# Patient Record
Sex: MALE | Race: WHITE | Employment: OTHER
[De-identification: names, ages, dates, MRNs, and addresses within clinical notes are randomized per-mention and may not be internally consistent; named-entity substitution may affect disease eponyms.]

---

## 2017-03-08 ENCOUNTER — OFFICE VISIT (OUTPATIENT)
Dept: NEUROLOGY | Facility: CLINIC | Age: 62
End: 2017-03-08

## 2017-03-08 VITALS
HEART RATE: 104 BPM | BODY MASS INDEX: 29.2 KG/M2 | WEIGHT: 204 LBS | SYSTOLIC BLOOD PRESSURE: 150 MMHG | DIASTOLIC BLOOD PRESSURE: 84 MMHG | HEIGHT: 70 IN

## 2017-03-08 DIAGNOSIS — G20 PARKINSON'S DISEASE (HCC): Primary | ICD-10-CM

## 2017-03-08 PROCEDURE — 99214 OFFICE O/P EST MOD 30 MIN: CPT | Performed by: PSYCHIATRY & NEUROLOGY

## 2017-03-08 RX ORDER — TESTOSTERONE CYPIONATE 200 MG/ML
200 INJECTION, SOLUTION INTRAMUSCULAR
COMMUNITY
Start: 2017-02-01 | End: 2018-04-04 | Stop reason: ALTCHOICE

## 2017-03-08 RX ORDER — ROPINIROLE 1 MG/1
1 TABLET, FILM COATED ORAL 3 TIMES DAILY
Qty: 90 TABLET | Refills: 5 | Status: SHIPPED | OUTPATIENT
Start: 2017-03-08 | End: 2017-05-30 | Stop reason: ALTCHOICE

## 2017-03-08 ASSESSMENT — ENCOUNTER SYMPTOMS
GASTROINTESTINAL NEGATIVE: 1
RESPIRATORY NEGATIVE: 1
EYES NEGATIVE: 1

## 2017-05-30 ENCOUNTER — OFFICE VISIT (OUTPATIENT)
Dept: NEUROLOGY | Age: 62
End: 2017-05-30
Payer: COMMERCIAL

## 2017-05-30 VITALS
HEART RATE: 92 BPM | HEIGHT: 70 IN | WEIGHT: 207.6 LBS | SYSTOLIC BLOOD PRESSURE: 133 MMHG | BODY MASS INDEX: 29.72 KG/M2 | DIASTOLIC BLOOD PRESSURE: 88 MMHG

## 2017-05-30 DIAGNOSIS — G20 PARKINSONISM, UNSPECIFIED PARKINSONISM TYPE (HCC): Primary | ICD-10-CM

## 2017-05-30 PROCEDURE — 99214 OFFICE O/P EST MOD 30 MIN: CPT | Performed by: PSYCHIATRY & NEUROLOGY

## 2017-05-30 RX ORDER — SILDENAFIL CITRATE 100 MG
TABLET ORAL NIGHTLY PRN
COMMUNITY
Start: 2017-04-26

## 2017-05-30 RX ORDER — CARBIDOPA AND LEVODOPA 25; 100 MG/1; MG/1
1 TABLET, EXTENDED RELEASE ORAL 3 TIMES DAILY
COMMUNITY
End: 2017-08-18 | Stop reason: ALTCHOICE

## 2017-05-30 ASSESSMENT — ENCOUNTER SYMPTOMS
EYES NEGATIVE: 1
RESPIRATORY NEGATIVE: 1
GASTROINTESTINAL NEGATIVE: 1

## 2017-06-05 ENCOUNTER — TELEPHONE (OUTPATIENT)
Dept: NEUROLOGY | Age: 62
End: 2017-06-05

## 2017-06-27 RX ORDER — SELEGILINE HYDROCHLORIDE 5 MG/1
5 CAPSULE ORAL
Qty: 180 CAPSULE | Refills: 0 | Status: SHIPPED | OUTPATIENT
Start: 2017-06-27 | End: 2018-04-04 | Stop reason: ALTCHOICE

## 2017-07-13 ENCOUNTER — TELEPHONE (OUTPATIENT)
Dept: NEUROLOGY | Age: 62
End: 2017-07-13

## 2017-08-18 ENCOUNTER — OFFICE VISIT (OUTPATIENT)
Dept: NEUROLOGY | Age: 62
End: 2017-08-18
Payer: COMMERCIAL

## 2017-08-18 VITALS
WEIGHT: 206 LBS | BODY MASS INDEX: 29.49 KG/M2 | HEART RATE: 73 BPM | DIASTOLIC BLOOD PRESSURE: 90 MMHG | SYSTOLIC BLOOD PRESSURE: 140 MMHG | HEIGHT: 70 IN

## 2017-08-18 DIAGNOSIS — G23.1 PROGRESSIVE SUPRANUCLEAR PALSY (HCC): Primary | ICD-10-CM

## 2017-08-18 PROCEDURE — 99214 OFFICE O/P EST MOD 30 MIN: CPT | Performed by: PSYCHIATRY & NEUROLOGY

## 2017-08-18 RX ORDER — QUETIAPINE FUMARATE 25 MG/1
12.5 TABLET, FILM COATED ORAL NIGHTLY
Refills: 5 | COMMUNITY
Start: 2017-08-15 | End: 2019-08-08 | Stop reason: ALTCHOICE

## 2017-08-18 RX ORDER — SELEGILINE HYDROCHLORIDE 5 MG/1
5 CAPSULE ORAL
Qty: 180 CAPSULE | Refills: 3 | Status: SHIPPED | OUTPATIENT
Start: 2017-08-18 | End: 2017-11-22 | Stop reason: SDUPTHER

## 2017-08-18 ASSESSMENT — ENCOUNTER SYMPTOMS
RESPIRATORY NEGATIVE: 1
GASTROINTESTINAL NEGATIVE: 1
EYES NEGATIVE: 1

## 2017-11-06 ENCOUNTER — TELEPHONE (OUTPATIENT)
Dept: NEUROLOGY | Age: 62
End: 2017-11-06

## 2017-11-22 ENCOUNTER — OFFICE VISIT (OUTPATIENT)
Dept: NEUROLOGY | Age: 62
End: 2017-11-22
Payer: COMMERCIAL

## 2017-11-22 VITALS
SYSTOLIC BLOOD PRESSURE: 141 MMHG | BODY MASS INDEX: 29.86 KG/M2 | DIASTOLIC BLOOD PRESSURE: 78 MMHG | HEIGHT: 70 IN | HEART RATE: 82 BPM | WEIGHT: 208.6 LBS

## 2017-11-22 DIAGNOSIS — G23.1 PROGRESSIVE SUPRANUCLEAR PALSY (HCC): Primary | ICD-10-CM

## 2017-11-22 PROCEDURE — 99214 OFFICE O/P EST MOD 30 MIN: CPT | Performed by: PSYCHIATRY & NEUROLOGY

## 2017-11-22 RX ORDER — DIVALPROEX SODIUM 250 MG/1
500 TABLET, DELAYED RELEASE ORAL 2 TIMES DAILY
Refills: 2 | COMMUNITY
Start: 2017-10-31 | End: 2020-08-25 | Stop reason: CLARIF

## 2017-11-22 RX ORDER — SELEGILINE HYDROCHLORIDE 5 MG/1
5 CAPSULE ORAL
Qty: 180 CAPSULE | Refills: 3 | Status: SHIPPED | OUTPATIENT
Start: 2017-11-22 | End: 2018-08-07 | Stop reason: ALTCHOICE

## 2017-11-22 ASSESSMENT — ENCOUNTER SYMPTOMS
GASTROINTESTINAL NEGATIVE: 1
ALLERGIC/IMMUNOLOGIC NEGATIVE: 1
EYES NEGATIVE: 1
RESPIRATORY NEGATIVE: 1

## 2017-11-22 NOTE — PROGRESS NOTES
Subjective:      Patient ID: Kylah Newman is a 64 y.o. male. HPI    Active problem progressive supranuclear palsy with rigidity, bradykinesia along with postural instability and increased muscle tone followed by [facility] . The condition is he has become more unsteady with more instability with tendency to fall backwards falling about three times per week with decreased arm swing reporting to have slower pace with feet not moving as fast . He has been placed on depakote through [facility] for he was felt to be aggressive short fuse . He was placed on seroquel at bedtime . He remains on provigil for excessive daytime sleepiness . He reports to be freezing in place  . He remains on eldepryl 5 mg po bid with him finding that he had some benefit initially . There are mild memory complaints retiring having retired from work  . Significant medication eldepryl 5 mg po bid, provigil 200 mg po qd , depakote 250 mg po bid , seroquel 25 mg po qhs  . Previously on azilect , sinemet and requip. Testing MRI of Head with nonspecific white matter signal changes in frontal lobe with mild ventricular enlargement with global volume loss and atrophy , June 2015 .  MRI of cervical spine with moderate canal stenosis C3-4 through C5-7      Past Medical History:   Diagnosis Date    Cancer ([address]     skin & prostate    Hyperlipidemia     Hypertension     Narcolepsy     Osteoarthritis     Parkinson disease ([address]     PSP (progressive supranuclear palsy) ([facility] [address] 05/05/2017       Past Surgical History:   Procedure Laterality Date    NOSE SURGERY  1973    PROSTATECTOMY  2012    radial    SHOULDER SURGERY Right 1998    TONSILLECTOMY AND ADENOIDECTOMY  1965       Family History   Problem Relation Age of Onset    High Blood Pressure Mother     Cancer Mother     High Blood Pressure Father     Diabetes Father     Heart Disease Father     Cancer Father      colon & liver       Social History     Social History    Marital status:      Spouse name: N/A    Number of children: N/A    Years of education: N/A     Social History Main Topics    Smoking status: Never Smoker    Smokeless tobacco: Never Used    Alcohol use 0.0 oz/week      Comment: occas    Drug use: No    Sexual activity: Not Asked     Other Topics Concern    None     Social History Narrative    None       Current Outpatient Prescriptions   Medication Sig Dispense Refill    divalproex (DEPAKOTE) 250 MG DR tablet Take 1 tablet by mouth 2 times daily  2    selegiline (ELDEPRYL) 5 MG capsule Take 1 capsule by mouth 2 times daily (before meals) 180 capsule 3    QUEtiapine (SEROQUEL) 25 MG tablet Take 12.5 mg by mouth nightly   5    VIAGRA 100 MG tablet       DEPO-TESTOSTERONE 200 MG/ML injection 200 mg  every two weeks IM.  chlorthalidone (HYGROTON) 25 MG tablet Take 1 tablet by mouth daily      modafinil (PROVIGIL) 200 MG tablet Take 200 mg by mouth 2 times daily      losartan (COZAAR) 100 MG tablet Take 100 mg by mouth daily.  pantoprazole (PROTONIX) 40 MG tablet   Take 40 mg by mouth daily as needed       simvastatin (ZOCOR) 40 MG tablet Take 40 mg by mouth nightly.  traMADol (ULTRAM) 50 MG tablet Take 50 mg by mouth every 12 hours as needed for Pain  .  selegiline (ELDEPRYL) 5 MG capsule Take 1 capsule by mouth 2 times daily (before meals) 180 capsule 0     No current facility-administered medications for this visit. No Known Allergies      Review of Systems   Constitutional: Positive for fatigue. HENT: Negative. Eyes: Negative. Respiratory: Negative. Cardiovascular: Negative. Gastrointestinal: Negative. Endocrine: Negative. Genitourinary: Negative. Musculoskeletal: Positive for gait problem and neck pain. Skin: Negative. Allergic/Immunologic: Negative. Neurological: Positive for speech difficulty. Hematological: Negative. All other systems reviewed and are negative.       Objective: Physical Exam    Vitals:    11/22/17 1504   BP: (!) 141/78   Pulse: 82     weight: 208 lb 9.6 oz (94.6 kg)    Neurological Examination  Constitutional .General exam well groomed   Ears /Nose/Throat: external ear . Normal exam  Neck and thyroid . Normal size  Respiratory . Breathsounds clear bilaterally  Cardiovascular: Auscultation of heart with regular rate and rhythm   Musculoskeletal. Muscle tone with minor rigidity                                                            Muscle strength 5/5 strength throughout                                                                                 No dysmetria or dysdiadokinesis  No tremor   Normal fine motor  Gait with reduced arm swing bilaterally with decrease en block turn with apraxia    Orientation Alert and oriented x 3   Short term memory normal  Attention and concentration normal   Language process and speech normal . No aphasia   Cranial nerve 2 normal acuety and visual fields  Cranial nerve 3, 4 and 6 . Saccadic pursuit. Decreased upward gaze  . Pupils are equal and reactive   Cranial nerve 5 . Normal strength of masseter and temporalis . Intact corneal reflex. Normal facial sensation  Cranial nerve 7 masked facies  Cranial nerve 8. Grossly intact hearing   Cranial nerve 9 and 10. Symmetric palate elevation   Cranial nerve 11 , 5 out of 5 strength   Cranial Nerve 12 midline tongue . No atrophy  Sensation . Normal proprioception . Intact Vibration . Normal pinprick and light touch   Deep Tendon Reflexes normal  Plantar response flexor bilaterally    Assessment:      1.  Progressive supranuclear palsy ([address]    Patient is to continue use cane for support to undergo gait retraining to continue on current medication regimen        Plan:      Orders Placed This Encounter   Procedures    Cane adjustable narrow base quad

## 2017-11-22 NOTE — LETTER
 PSP (progressive supranuclear palsy) ([address] 05/05/2017       Past Surgical History:   Procedure Laterality Date    NOSE SURGERY  1973    PROSTATECTOMY  2012    radial    SHOULDER SURGERY Right 1998    TONSILLECTOMY AND ADENOIDECTOMY  1965       Family History   Problem Relation Age of Onset    High Blood Pressure Mother     Cancer Mother     High Blood Pressure Father     Diabetes Father     Heart Disease Father     Cancer Father      colon & liver       Social History     Social History    Marital status:      Spouse name: N/A    Number of children: N/A    Years of education: N/A     Social History Main Topics    Smoking status: Never Smoker    Smokeless tobacco: Never Used    Alcohol use 0.0 oz/week      Comment: occas    Drug use: No    Sexual activity: Not Asked     Other Topics Concern    None     Social History Narrative    None       Current Outpatient Prescriptions   Medication Sig Dispense Refill    divalproex (DEPAKOTE) 250 MG DR tablet Take 1 tablet by mouth 2 times daily  2    selegiline (ELDEPRYL) 5 MG capsule Take 1 capsule by mouth 2 times daily (before meals) 180 capsule 3    QUEtiapine (SEROQUEL) 25 MG tablet Take 12.5 mg by mouth nightly   5    VIAGRA 100 MG tablet       DEPO-TESTOSTERONE 200 MG/ML injection 200 mg  every two weeks IM.  chlorthalidone (HYGROTON) 25 MG tablet Take 1 tablet by mouth daily      modafinil (PROVIGIL) 200 MG tablet Take 200 mg by mouth 2 times daily      losartan (COZAAR) 100 MG tablet Take 100 mg by mouth daily.  pantoprazole (PROTONIX) 40 MG tablet   Take 40 mg by mouth daily as needed       simvastatin (ZOCOR) 40 MG tablet Take 40 mg by mouth nightly.  traMADol (ULTRAM) 50 MG tablet Take 50 mg by mouth every 12 hours as needed for Pain  .  selegiline (ELDEPRYL) 5 MG capsule Take 1 capsule by mouth 2 times daily (before meals) 180 capsule 0     No current facility-administered medications for this visit. Sensation . Normal proprioception . Intact Vibration . Normal pinprick and light touch   Deep Tendon Reflexes normal  Plantar response flexor bilaterally    Assessment:      1. Progressive supranuclear palsy ([address]    Patient is to continue use cane for support to undergo gait retraining to continue on current medication regimen        Plan:      Orders Placed This Encounter   Procedures    Cane adjustable narrow base quad               If you have questions, please do not hesitate to call me. I look forward to following Nba Conde along with you.     Sincerely,        Trisha Avila MD    [facility] providers:  Nayana Lebron, [id number] [address]: [phone]

## 2017-11-27 NOTE — COMMUNICATION BODY
Subjective:      Patient ID: Yuriy Muller is a 64 y.o. male. HPI    Active problem progressive supranuclear palsy with rigidity, bradykinesia along with postural instability and increased muscle tone followed by [facility] . The condition is he has become more unsteady with more instability with tendency to fall backwards falling about three times per week with decreased arm swing reporting to have slower pace with feet not moving as fast . He has been placed on depakote through [facility] for he was felt to be aggressive short fuse . He was placed on seroquel at bedtime . He remains on provigil for excessive daytime sleepiness . He reports to be freezing in place  . He remains on eldepryl 5 mg po bid with him finding that he had some benefit initially . There are mild memory complaints retiring having retired from work  . Significant medication eldepryl 5 mg po bid, provigil 200 mg po qd , depakote 250 mg po bid , seroquel 25 mg po qhs  . Previously on azilect , sinemet and requip. Testing MRI of Head with nonspecific white matter signal changes in frontal lobe with mild ventricular enlargement with global volume loss and atrophy , June 2015 .  MRI of cervical spine with moderate canal stenosis C3-4 through C5-7      Past Medical History:   Diagnosis Date    Cancer ([address]     skin & prostate    Hyperlipidemia     Hypertension     Narcolepsy     Osteoarthritis     Parkinson disease ([address]     PSP (progressive supranuclear palsy) ([facility] [address] 05/05/2017       Past Surgical History:   Procedure Laterality Date    NOSE SURGERY  1973    PROSTATECTOMY  2012    radial    SHOULDER SURGERY Right 1998    TONSILLECTOMY AND ADENOIDECTOMY  1965       Family History   Problem Relation Age of Onset    High Blood Pressure Mother     Cancer Mother     High Blood Pressure Father     Diabetes Father     Heart Disease Father     Cancer Father      colon & liver       Social History     Social History   

## 2018-03-27 ENCOUNTER — TELEPHONE (OUTPATIENT)
Dept: NEUROLOGY | Age: 63
End: 2018-03-27

## 2018-04-04 ENCOUNTER — OFFICE VISIT (OUTPATIENT)
Dept: NEUROLOGY | Age: 63
End: 2018-04-04
Payer: COMMERCIAL

## 2018-04-04 VITALS
HEIGHT: 70 IN | WEIGHT: 216.8 LBS | DIASTOLIC BLOOD PRESSURE: 83 MMHG | HEART RATE: 69 BPM | BODY MASS INDEX: 31.04 KG/M2 | SYSTOLIC BLOOD PRESSURE: 137 MMHG

## 2018-04-04 DIAGNOSIS — G23.1 PROGRESSIVE SUPRANUCLEAR PALSY (HCC): Primary | ICD-10-CM

## 2018-04-04 PROCEDURE — 99214 OFFICE O/P EST MOD 30 MIN: CPT | Performed by: PSYCHIATRY & NEUROLOGY

## 2018-04-04 ASSESSMENT — ENCOUNTER SYMPTOMS
GASTROINTESTINAL NEGATIVE: 1
SHORTNESS OF BREATH: 1
EYES NEGATIVE: 1
BACK PAIN: 1
ALLERGIC/IMMUNOLOGIC NEGATIVE: 1

## 2018-05-22 ENCOUNTER — TELEPHONE (OUTPATIENT)
Dept: NEUROLOGY | Age: 63
End: 2018-05-22

## 2018-08-07 ENCOUNTER — OFFICE VISIT (OUTPATIENT)
Dept: NEUROLOGY | Age: 63
End: 2018-08-07
Payer: COMMERCIAL

## 2018-08-07 VITALS
SYSTOLIC BLOOD PRESSURE: 138 MMHG | HEART RATE: 84 BPM | BODY MASS INDEX: 32.07 KG/M2 | WEIGHT: 224 LBS | HEIGHT: 70 IN | DIASTOLIC BLOOD PRESSURE: 80 MMHG

## 2018-08-07 DIAGNOSIS — G23.1 PROGRESSIVE SUPRANUCLEAR PALSY (HCC): Primary | ICD-10-CM

## 2018-08-07 PROCEDURE — 99214 OFFICE O/P EST MOD 30 MIN: CPT | Performed by: PSYCHIATRY & NEUROLOGY

## 2018-08-07 ASSESSMENT — ENCOUNTER SYMPTOMS
COUGH: 1
GASTROINTESTINAL NEGATIVE: 1
SHORTNESS OF BREATH: 1
BACK PAIN: 1
ALLERGIC/IMMUNOLOGIC NEGATIVE: 1

## 2018-08-07 NOTE — PROGRESS NOTES
Subjective:      Patient ID: Jose Langston is a 58 y.o. male. HPI    Active problem progressive supranuclear palsy with rigidity, bradykinesia along with postural instability followed by [facility] using cane for support to undergo gait retraining  . 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 He was given prescription for rolling walker with seat along with PT not always using this . He is falling twice per week  . He is being prepped for drug study at [facility] with anti tau immunoglobulin infusion. He has undergone PT which is helping him unfreeze uing laser light trying to walk over the light  . He retains unsteadiness with shuffling with short gait with instability with tendency to fall backwards falling about twice per week . There is some postural tremor of right hand . He is on depakote through [facility] for he was felt to be aggressive with short fuse taking seroquel also at bedtime . He has sleep apnea being on nasal CPAP remaining on provigil. There are mild memory complaints . Significant medication provigil 200 mg po qd , depakote 250 mg po bid , seroquel 25 mg po qhs  . Previously on azilect , sinemet. , eldepryl , amantadine and requip. Testing MRI of Head with nonspecific white matter signal changes in frontal lobe with mild ventricular enlargement with global volume loss and atrophy , June 2015 .  MRI of cervical spine with moderate canal stenosis C3-4 through C5-7      Past Medical History:   Diagnosis Date    Cancer ([address]     skin & prostate    Hyperlipidemia     Hypertension     Narcolepsy     Osteoarthritis     Parkinson disease ([facility]     PSP (progressive supranuclear palsy) ([facility] [address] 05/05/2017    Sleep apnea        Past Surgical History:   Procedure Laterality Date    NOSE SURGERY  1973    PROSTATECTOMY  2012    radial    SHOULDER SURGERY Right 1998    TONSILLECTOMY AND ADENOIDECTOMY  1965       Family History   Problem Relation Age of Onset    High Blood Pressure Mother     Cancer Mother     High Blood Pressure Father     Diabetes Father     Heart Disease Father     Cancer Father         colon & liver       Social History     Social History    Marital status:      Spouse name: N/A    Number of children: N/A    Years of education: N/A     Social History Main Topics    Smoking status: Never Smoker    Smokeless tobacco: Never Used    Alcohol use 0.0 oz/week      Comment: occas    Drug use: No    Sexual activity: Not Asked     Other Topics Concern    None     Social History Narrative    None       Current Outpatient Prescriptions   Medication Sig Dispense Refill    divalproex (DEPAKOTE) 250 MG DR tablet Take 1 tablet by mouth 2 times daily  2    QUEtiapine (SEROQUEL) 25 MG tablet Take 12.5 mg by mouth nightly   5    VIAGRA 100 MG tablet       chlorthalidone (HYGROTON) 25 MG tablet Take 1 tablet by mouth daily      modafinil (PROVIGIL) 200 MG tablet Take 200 mg by mouth 2 times daily      losartan (COZAAR) 100 MG tablet Take 100 mg by mouth daily.  pantoprazole (PROTONIX) 40 MG tablet   Take 40 mg by mouth daily as needed       simvastatin (ZOCOR) 40 MG tablet Take 40 mg by mouth nightly. No current facility-administered medications for this visit. No Known Allergies      Review of Systems   Constitutional: Positive for fatigue and unexpected weight change. HENT: Negative. Eyes: Positive for visual disturbance. Respiratory: Positive for cough and shortness of breath. Cardiovascular: Positive for leg swelling. Gastrointestinal: Negative. Endocrine: Negative. Genitourinary: Positive for frequency and urgency.    Musculoskeletal: Positive for back pain, gait problem and neck pain. Skin: Negative. Allergic/Immunologic: Negative. Neurological: Positive for tremors and speech difficulty. Hematological: Negative. Objective:   Physical Exam    Vitals:    08/07/18 0951   BP: 138/80   Pulse: 84     weight: 224 lb (101.6 kg)    Neurological Examination  Constitutional .General exam well groomed   Ears /Nose/Throat: external ear . Normal exam  Neck and thyroid . Normal size  Respiratory . Breathsounds clear bilaterally  Cardiovascular: Auscultation of heart with regular rate and rhythm   Musculoskeletal. Muscle tone with minor rigidity                                                            Muscle strength 5/5 strength throughout                                                                                 No dysmetria or dysdiadokinesis  No tremor   Normal fine motor  Gait with reduced arm swing bilaterally with decrease en block turn with apraxia with truncal rigidity   Orientation Alert and oriented x 3   Short term memory normal  Attention and concentration normal   Language process and speech normal . No aphasia   Cranial nerve 2 normal acuety and visual fields  Cranial nerve 3, 4 and 6 . Saccadic pursuit. Decreased upward gaze  . Pupils are equal and reactive   Cranial nerve 5 . Normal strength of masseter and temporalis . Intact corneal reflex. Normal facial sensation  Cranial nerve 7 masked facies  Cranial nerve 8. Grossly intact hearing   Cranial nerve 9 and 10. Symmetric palate elevation   Cranial nerve 11 , 5 out of 5 strength   Cranial Nerve 12 midline tongue . No atrophy  Sensation . Normal proprioception . Intact Vibration . Normal pinprick and light touch   Deep Tendon Reflexes normal  Plantar response flexor bilaterally    Assessment:      1.  Progressive supranuclear palsy ([address]    He is to continue PT and current regimen with fu at [facility]:      As above

## 2018-08-08 NOTE — COMMUNICATION BODY
Subjective:      Patient ID: Luh Curran is a 58 y.o. male. HPI    Active problem progressive supranuclear palsy with rigidity, bradykinesia along with postural instability followed by [facility] using cane for support to undergo gait retraining  . 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 He was given prescription for rolling walker with seat along with PT not always using this . He is falling twice per week  . He is being prepped for drug study at [facility] with anti tau immunoglobulin infusion. He has undergone PT which is helping him unfreeze uing laser light trying to walk over the light  . He retains unsteadiness with shuffling with short gait with instability with tendency to fall backwards falling about twice per week . There is some postural tremor of right hand . He is on depakote through [facility] for he was felt to be aggressive with short fuse taking seroquel also at bedtime . He has sleep apnea being on nasal CPAP remaining on provigil. There are mild memory complaints . Significant medication provigil 200 mg po qd , depakote 250 mg po bid , seroquel 25 mg po qhs  . Previously on azilect , sinemet. , eldepryl , amantadine and requip. Testing MRI of Head with nonspecific white matter signal changes in frontal lobe with mild ventricular enlargement with global volume loss and atrophy , June 2015 .  MRI of cervical spine with moderate canal stenosis C3-4 through C5-7      Past Medical History:   Diagnosis Date    Cancer ([address]     skin & prostate    Hyperlipidemia     Hypertension     Narcolepsy     Osteoarthritis     Parkinson disease ([facility] [facility]     PSP (progressive supranuclear palsy) ([facility] [address] 05/05/2017    pain, gait problem and neck pain. Skin: Negative. Allergic/Immunologic: Negative. Neurological: Positive for tremors and speech difficulty. Hematological: Negative. Objective:   Physical Exam    Vitals:    08/07/18 0951   BP: 138/80   Pulse: 84     weight: 224 lb (101.6 kg)    Neurological Examination  Constitutional .General exam well groomed   Ears /Nose/Throat: external ear . Normal exam  Neck and thyroid . Normal size  Respiratory . Breathsounds clear bilaterally  Cardiovascular: Auscultation of heart with regular rate and rhythm   Musculoskeletal. Muscle tone with minor rigidity                                                            Muscle strength 5/5 strength throughout                                                                                 No dysmetria or dysdiadokinesis  No tremor   Normal fine motor  Gait with reduced arm swing bilaterally with decrease en block turn with apraxia with truncal rigidity   Orientation Alert and oriented x 3   Short term memory normal  Attention and concentration normal   Language process and speech normal . No aphasia   Cranial nerve 2 normal acuety and visual fields  Cranial nerve 3, 4 and 6 . Saccadic pursuit. Decreased upward gaze  . Pupils are equal and reactive   Cranial nerve 5 . Normal strength of masseter and temporalis . Intact corneal reflex. Normal facial sensation  Cranial nerve 7 masked facies  Cranial nerve 8. Grossly intact hearing   Cranial nerve 9 and 10. Symmetric palate elevation   Cranial nerve 11 , 5 out of 5 strength   Cranial Nerve 12 midline tongue . No atrophy  Sensation . Normal proprioception . Intact Vibration . Normal pinprick and light touch   Deep Tendon Reflexes normal  Plantar response flexor bilaterally    Assessment:      1.  Progressive supranuclear palsy ([address]    He is to continue PT and current regimen with FU at [facility]:      As above

## 2018-11-12 ENCOUNTER — OFFICE VISIT (OUTPATIENT)
Dept: NEUROLOGY | Age: 63
End: 2018-11-12
Payer: COMMERCIAL

## 2018-11-12 VITALS
DIASTOLIC BLOOD PRESSURE: 86 MMHG | HEART RATE: 95 BPM | SYSTOLIC BLOOD PRESSURE: 138 MMHG | HEIGHT: 70 IN | WEIGHT: 222.6 LBS | BODY MASS INDEX: 31.87 KG/M2

## 2018-11-12 DIAGNOSIS — G23.1 PROGRESSIVE SUPRANUCLEAR PALSY (HCC): Primary | ICD-10-CM

## 2018-11-12 DIAGNOSIS — M48.02 CERVICAL STENOSIS OF SPINE: ICD-10-CM

## 2018-11-12 DIAGNOSIS — R26.81 GAIT INSTABILITY: ICD-10-CM

## 2018-11-12 PROCEDURE — 99214 OFFICE O/P EST MOD 30 MIN: CPT | Performed by: PSYCHIATRY & NEUROLOGY

## 2018-11-12 RX ORDER — AMANTADINE HYDROCHLORIDE 100 MG/1
100 TABLET ORAL DAILY
COMMUNITY
End: 2019-02-20 | Stop reason: ALTCHOICE

## 2018-11-12 RX ORDER — WHEELCHAIR
EACH MISCELLANEOUS
Qty: 1 EACH | Refills: 0 | Status: SHIPPED | OUTPATIENT
Start: 2018-11-12

## 2018-11-12 RX ORDER — SELEGILINE HYDROCHLORIDE 5 MG/1
5 TABLET ORAL 2 TIMES DAILY
COMMUNITY
End: 2020-08-25 | Stop reason: ALTCHOICE

## 2018-11-12 ASSESSMENT — ENCOUNTER SYMPTOMS
SHORTNESS OF BREATH: 1
ALLERGIC/IMMUNOLOGIC NEGATIVE: 1
BACK PAIN: 1
GASTROINTESTINAL NEGATIVE: 1

## 2018-11-12 NOTE — PROGRESS NOTES
Subjective:      Patient ID: Ange Kingsley is a 58 y.o. male. HPI    Active problem progressive supranuclear palsy with rigidity, bradykinesia along with postural instability followed by [facility] . The condition is he reports he is using U step using this more often feeling more secure with this walking device  . There are falls every two days loosing balance some when not using U step freezing the majority falling backwards . If he lets go of the handle  U step will stop although he will usually still  handle and U step will go forwards with him being stuck in place unable to keep up . There is neck which is there all  the time at grade 4 over 10 using naprosyn. There is also low back stiffness . He is on drug study through [facility] being on tau binding medication. He has gone back on eldepryl 5 mg po bid 2 months ago along with being placed on amantadine to be increased currently on 100 mg po qd . He is on depakote through [facility] for he was felt to be aggressive with short fuse taking seroquel also at bedtime . He has sleep apnea being on nasal CPAP remaining on provigil. There are mild memory complaints . Significant medication provigil 200 mg po bid , depakote 250 mg po bid , seroquel 25 mg po qhs , eldepryl 5 mg po bid , amantadine 100 mg po qd  . Previously on azilect , sinemet and requip . Testing MRI of Head with nonspecific white matter signal changes in frontal lobe with mild ventricular enlargement with global volume loss and atrophy , June 2015 .  MRI of cervical spine with moderate canal stenosis C3-4 through C5-7      Past Medical History:   Diagnosis Date    Cancer ([address]     skin & prostate    Hyperlipidemia     Hypertension     Narcolepsy     Osteoarthritis     [facility]     PSP (progressive supranuclear palsy) ([facility] 05/05/2017    Sleep apnea        Past Surgical History:   Procedure Laterality Date    NOSE SURGERY  1973    PROSTATECTOMY  2012 Negative. Eyes: Positive for visual disturbance. Respiratory: Positive for shortness of breath. Cardiovascular: Positive for leg swelling. Gastrointestinal: Negative. Endocrine: Negative. Genitourinary: Positive for frequency and urgency. Musculoskeletal: Positive for arthralgias, back pain, gait problem, myalgias and neck pain. Skin: Negative. Allergic/Immunologic: Negative. Neurological: Positive for tremors and speech difficulty. Hematological: Negative. Psychiatric/Behavioral: Negative. Objective:   Physical Exam    Vitals:    11/12/18 1430   BP: 138/86   Pulse: 95     weight: 222 lb 9.6 oz (101 kg)    Neurological Examination  Constitutional .General exam well groomed   Ears /Nose/Throat: external ear . Normal exam  Neck and thyroid . Normal size  Respiratory . Breathsounds clear bilaterally  Cardiovascular: Auscultation of heart with regular rate and rhythm   Musculoskeletal. Muscle tone with minor rigidity                                                            Muscle strength 5/5 strength throughout                                                                                 No dysmetria or dysdiadokinesis  No tremor   Normal fine motor  Gait with reduced arm swing bilaterally with decrease en block turn with apraxia with truncal rigidity   Orientation Alert and oriented x 3   Short term memory normal  Attention and concentration normal   Language process and speech normal . No aphasia   Cranial nerve 2 normal acuety and visual fields  Cranial nerve 3, 4 and 6 . Saccadic pursuit. Decreased upward gaze  . Pupils are equal and reactive   Cranial nerve 5 . Normal strength of masseter and temporalis . Intact corneal reflex. Normal facial sensation  Cranial nerve 7 masked facies  Cranial nerve 8. Grossly intact hearing   Cranial nerve 9 and 10. Symmetric palate elevation   Cranial nerve 11 , 5 out of 5 strength   Cranial Nerve 12 midline tongue .  No atrophy  Sensation

## 2018-11-13 ENCOUNTER — TELEPHONE (OUTPATIENT)
Dept: NEUROLOGY | Age: 63
End: 2018-11-13

## 2018-11-13 NOTE — COMMUNICATION BODY
radial    SHOULDER SURGERY Right 1998    TONSILLECTOMY AND ADENOIDECTOMY  1965       Family History   Problem Relation Age of Onset    High Blood Pressure Mother     Cancer Mother     High Blood Pressure Father     Diabetes Father     Heart Disease Father     Cancer Father         colon & liver       Social History     Social History    Marital status:      Spouse name: N/A    Number of children: N/A    Years of education: N/A     Social History Main Topics    Smoking status: Never Smoker    Smokeless tobacco: Never Used    Alcohol use 0.0 oz/week      Comment: occas    Drug use: No    Sexual activity: Not Asked     Other Topics Concern    None     Social History Narrative    None       Current Outpatient Prescriptions   Medication Sig Dispense Refill    NAPROXEN PO Take 220 mg by mouth 2 times daily as needed      selegiline (ELDEPRYL) 5 MG tablet Take 5 mg by mouth 2 times daily      Amantadine (SYMMETREL) 100 MG TABS tablet Take 100 mg by mouth daily      MINOXIDIL, TOPICAL, 5 % SOLN Apply topically 2 times daily      Misc. Devices (ROLLER WALKER) MISC 1 each by Does not apply route daily With brakes and seat 1 each 0    Misc. Devices (WHEELCHAIR) MISC Light wheelchair 1 each 0    divalproex (DEPAKOTE) 250 MG DR tablet Take 1 tablet by mouth 2 times daily  2    QUEtiapine (SEROQUEL) 25 MG tablet Take 12.5 mg by mouth nightly   5    VIAGRA 100 MG tablet       chlorthalidone (HYGROTON) 25 MG tablet Take 1 tablet by mouth daily      modafinil (PROVIGIL) 200 MG tablet Take 200 mg by mouth 2 times daily      losartan (COZAAR) 100 MG tablet Take 100 mg by mouth daily.  pantoprazole (PROTONIX) 40 MG tablet   Take 40 mg by mouth daily as needed       simvastatin (ZOCOR) 40 MG tablet Take 40 mg by mouth nightly. No current facility-administered medications for this visit. No Known Allergies      Review of Systems   Constitutional: Positive for fatigue.    HENT:

## 2018-11-16 DIAGNOSIS — G23.1 PROGRESSIVE SUPRANUCLEAR OPHTHALMOPLEGIA (HCC): Primary | ICD-10-CM

## 2018-11-26 DIAGNOSIS — R26.81 GAIT INSTABILITY: ICD-10-CM

## 2019-02-20 ENCOUNTER — OFFICE VISIT (OUTPATIENT)
Dept: NEUROLOGY | Age: 64
End: 2019-02-20
Payer: COMMERCIAL

## 2019-02-20 VITALS
SYSTOLIC BLOOD PRESSURE: 139 MMHG | DIASTOLIC BLOOD PRESSURE: 84 MMHG | HEIGHT: 70 IN | WEIGHT: 218 LBS | HEART RATE: 74 BPM | BODY MASS INDEX: 31.21 KG/M2

## 2019-02-20 DIAGNOSIS — G23.1 PROGRESSIVE SUPRANUCLEAR OPHTHALMOPLEGIA (HCC): Primary | ICD-10-CM

## 2019-02-20 PROCEDURE — 99214 OFFICE O/P EST MOD 30 MIN: CPT | Performed by: PSYCHIATRY & NEUROLOGY

## 2019-02-20 RX ORDER — BRIMONIDINE TARTRATE 2 MG/ML
1 SOLUTION/ DROPS OPHTHALMIC 2 TIMES DAILY
COMMUNITY

## 2019-02-20 ASSESSMENT — ENCOUNTER SYMPTOMS
ALLERGIC/IMMUNOLOGIC NEGATIVE: 1
BACK PAIN: 1
RESPIRATORY NEGATIVE: 1
GASTROINTESTINAL NEGATIVE: 1

## 2019-08-08 ENCOUNTER — OFFICE VISIT (OUTPATIENT)
Dept: NEUROLOGY | Age: 64
End: 2019-08-08
Payer: COMMERCIAL

## 2019-08-08 VITALS
DIASTOLIC BLOOD PRESSURE: 75 MMHG | WEIGHT: 218.8 LBS | SYSTOLIC BLOOD PRESSURE: 112 MMHG | BODY MASS INDEX: 31.32 KG/M2 | HEART RATE: 76 BPM | HEIGHT: 70 IN

## 2019-08-08 DIAGNOSIS — G23.1 PROGRESSIVE SUPRANUCLEAR OPHTHALMOPLEGIA (HCC): Primary | ICD-10-CM

## 2019-08-08 PROCEDURE — 99214 OFFICE O/P EST MOD 30 MIN: CPT | Performed by: PSYCHIATRY & NEUROLOGY

## 2019-08-08 RX ORDER — SELEGILINE HYDROCHLORIDE 5 MG/1
5 CAPSULE ORAL
Qty: 180 CAPSULE | Refills: 3 | Status: SHIPPED
Start: 2019-08-08 | End: 2020-08-25 | Stop reason: ALTCHOICE

## 2019-08-08 ASSESSMENT — ENCOUNTER SYMPTOMS
GASTROINTESTINAL NEGATIVE: 1
COUGH: 1
BACK PAIN: 1
SHORTNESS OF BREATH: 1
ALLERGIC/IMMUNOLOGIC NEGATIVE: 1

## 2019-08-08 NOTE — LETTER
with global volume loss and atrophy , June 2015 . MRI of cevical sine mild mass effect on central cord at C4-5 and minor C5-6      Past Medical History:   Diagnosis Date    [facility] ([facility]     skin & prostate    Glaucoma     Hyperlipidemia     Hypertension     Narcolepsy     Osteoarthritis     Parkinson disease [facility]     PSP [facility]pro[facility] supranuclear palsy) [facility] 05/05/2017    Sleep apnea        Past Surgical History:   Procedure Laterality Date    NOSE SURGERY  1973    PROSTATECTOMY  2012    radial    SHOULDER SURGERY Right 1998    TONSILLECTOMY AND ADENOIDECTOMY  1965       Family History   Problem Relation Age of Onset    High Blood Pressure Mother     Cancer Mother     High Blood Pressure Father     Diabetes Father     Heart Disease Father     Cancer Father         colon & liver       Social History     Socioeconomic History    Marital status:      Spouse name: None    Number of children: None    Years of education: None    Highest education level: None   Occupational History    None   Social Needs    Financial resource strain: None    Food insecurity:     Worry: None     Inability: None    Transportation needs:     Medical: None     Non-medical: None   Tobacco Use    Smoking status: Never Smoker    Smokeless tobacco: Never Used   Substance and Sexual Activity    Alcohol use:  Yes     Alcohol/week: 0.0 standard drinks     Comment: occas    Drug use: No    Sexual activity: None   Lifestyle    Physical activity:     Days per week: None     Minutes per session: None    Stress: None   Relationships    Social connections:     Talks on phone: None     Gets together: None     Attends Jew service: None     Active member of club or organization: None     Attends meetings of clubs or organizations: None     Relationship status: None    Intimate partner violence:     Fear of current or ex partner: None     Emotionally abused: None     Physically abused: None Forced sexual activity: None   Other Topics Concern    None   Social History Narrative    None       Current Outpatient Medications   Medication Sig Dispense Refill    selegiline (ELDEPRYL) 5 MG capsule Take 1 capsule by mouth 2 times daily (before meals) 180 capsule 3    timolol (BETIMOL) 0.5 % ophthalmic solution 1 drop 2 times daily      brimonidine (ALPHAGAN P) 0.15 % ophthalmic solution 1 drop 2 times daily      NAPROXEN PO Take 220 mg by mouth 2 times daily as needed      selegiline (ELDEPRYL) 5 MG tablet Take 5 mg by mouth 2 times daily      MINOXIDIL, TOPICAL, 5 % SOLN Apply topically 2 times daily      Misc. Devices (ROLLER WALKER) MISC 1 each by Does not apply route daily With brakes and seat 1 each 0    Misc. Devices (WHEELCHAIR) MISC Light wheelchair 1 each 0    divalproex (DEPAKOTE) 250 MG DR tablet Take 500 mg by mouth 2 times daily   2    VIAGRA 100 MG tablet       chlorthalidone (HYGROTON) 25 MG tablet Take 1 tablet by mouth daily      modafinil (PROVIGIL) 200 MG tablet Take 200 mg by mouth 2 times daily      losartan (COZAAR) 100 MG tablet Take 100 mg by mouth daily.  pantoprazole (PROTONIX) 40 MG tablet   Take 40 mg by mouth daily as needed       simvastatin (ZOCOR) 40 MG tablet Take 40 mg by mouth nightly. No current facility-administered medications for this visit. No Known Allergies      Review of Systems   Constitutional: Positive for fatigue. HENT: Negative. Respiratory: Positive for cough and shortness of breath. Cardiovascular: Negative. Gastrointestinal: Negative. Endocrine: Negative. Genitourinary: Positive for frequency and urgency. Musculoskeletal: Positive for arthralgias, back pain, gait problem, myalgias and neck pain. Skin: Negative. Allergic/Immunologic: Negative. Neurological: Positive for dizziness, tremors, speech difficulty, weakness and numbness. Hematological: Negative. Psychiatric/Behavioral: Negative.

## 2019-08-08 NOTE — PROGRESS NOTES
Subjective:      Patient ID: Ada Soler is a 61 y.o. male. HPI    Active problem progressive supranuclear palsy with rigidity, bradykinesia along with postural instability followed by [facility] . Cervical stenosis  . The condition is he reports that tau protein drug has been stopped being off medication since July having gotten IV infusion every month . His wife reports that he has been freezing more since he has been off the medication mainly on turning being stuck in place for few minutes always more in lateral movement  . He will fall daily loosing balance falling mainly falling backwards . He use walker for longer distances not using walker all the time al falls being without walker use . There is no trouble swallowing . His speech is getting softer . He is off seroquel making him sleepy. He is off amantadine having skin reaction of livido reticularis. There is intermittent neck pain attenuated with aleve . There is mild low back stiffness . He is on eldepryl 5 mg po bid  . He is on depakote through [facility] for he was felt to be aggressive . He has sleep apnea being on nasal CPAP remaining on provigil. There are mild memory complaints . Significant medication provigil 200 mg po bid , depakote 250 mg po bid , eldepryl 5 mg po bid  . Previously on azilect ,seroqul ,  sinemet and requip . Testing MRI of Head with nonspecific white matter signal changes in frontal lobe with mild ventricular enlargement with global volume loss and atrophy , June 2015 .   MRI of cevical sine mild mass effect on central cord at C4-5 and minor C5-6      Past Medical History:   Diagnosis Date    Cancer ([address]     skin & prostate    Glaucoma     Hyperlipidemia     Hypertension     Narcolepsy     Osteoarthritis     [facility]     PSP (progressive supranuclear palsy) ([facility] 05/05/2017    Sleep apnea        Past Surgical History:   Procedure Laterality Date    NOSE SURGERY  1973    PROSTATECTOMY

## 2019-08-08 NOTE — COMMUNICATION BODY
Subjective:      Patient ID: Tomy Tariq is a 61 y.o. male. HPI    Active problem progressive supranuclear palsy with rigidity, bradykinesia along with postural instability followed by [facility] . Cervical stenosis  . The condition is he reports that tau protein drug has been stopped being off medication since July having gotten IV infusion every month . His wife reports that he has been freezing more since he has been off the medication mainly on turning being stuck in place for few minutes always more in lateral movement  . He will fall daily loosing balance falling mainly falling backwards . He use walker for longer distances not using walker all the time al falls being without walker use . There is no trouble swallowing . His speech is getting softer . He is off seroquel making him sleepy. He is off amantadine having skin reaction of livido reticularis. There is intermittent neck pain attenuated with aleve . There is mild low back stiffness . He is on eldepryl 5 mg po bid  . He is on depakote through [facility] for he was felt to be aggressive . He has sleep apnea being on nasal CPAP remaining on provigil. There are mild memory complaints . Significant medication provigil 200 mg po bid , depakote 250 mg po bid , eldepryl 5 mg po bid  . Previously on azilect ,seroqul ,  sinemet and requip . Testing MRI of Head with nonspecific white matter signal changes in frontal lobe with mild ventricular enlargement with global volume loss and atrophy , June 2015 .   MRI of cevical sine mild mass effect on central cord at C4-5 and minor C5-6      Past Medical History:   Diagnosis Date    [facility] ([facility]     skin & prostate    Glaucoma     Hyperlipidemia     Hypertension     Narcolepsy     Osteoarthritis     Parkinson disease ([facility]     PSP (progressive supranuclear palsy) ([facility] 05/05/2017    Sleep apnea        Past Surgical History:   Procedure Laterality Date    NOSE SURGERY  1973    PROSTATECTOMY

## 2020-02-19 ENCOUNTER — OFFICE VISIT (OUTPATIENT)
Dept: NEUROLOGY | Age: 65
End: 2020-02-19
Payer: COMMERCIAL

## 2020-02-19 VITALS
SYSTOLIC BLOOD PRESSURE: 132 MMHG | DIASTOLIC BLOOD PRESSURE: 83 MMHG | WEIGHT: 218.6 LBS | BODY MASS INDEX: 31.3 KG/M2 | HEIGHT: 70 IN | HEART RATE: 74 BPM

## 2020-02-19 PROCEDURE — 99214 OFFICE O/P EST MOD 30 MIN: CPT | Performed by: PSYCHIATRY & NEUROLOGY

## 2020-02-19 RX ORDER — ARMODAFINIL 250 MG/1
250 TABLET ORAL DAILY
COMMUNITY

## 2020-02-19 RX ORDER — CHOLECALCIFEROL (VITAMIN D3) 125 MCG
5 CAPSULE ORAL NIGHTLY
COMMUNITY

## 2020-02-19 ASSESSMENT — ENCOUNTER SYMPTOMS
BACK PAIN: 1
GASTROINTESTINAL NEGATIVE: 1
COUGH: 1
SHORTNESS OF BREATH: 1
ALLERGIC/IMMUNOLOGIC NEGATIVE: 1

## 2020-02-19 NOTE — LETTER
[facility]  [phone] [facility] [address]  Phone: [phone]  Fax: [phone]    Matthew Sorto MD        February 20, 2020       Patient: Elise Ramirez   MR Number: [id number]   YOB: 1955   Date of Visit: 2/19/2020       Dear Dr. Wally Butler: Thank you for the request for consultation for [facility] . Below are the relevant portions of my assessment and plan of care. Subjective:      Patient ID: Elise Ramirez is a 59 y.o. male. HPI    Active problem progressive supranuclear palsy with rigidity, bradykinesia along with postural instability followed by [facility] . Cervical stenosis  . The condition is he reports is exercising lifting weights three times per week using also low lying bike during the summer  . He reports that he is having freezing getting stuck in place for about one minute mainly precicipitated on turning . He is not using walker intermittently having a falls mostly backwards . There maybe some choking having passed swallow study. He has been arranged to have another course of speech therapy through [facility] for dysarthria and swallowing issues . He is done with study drug . He has stopped eldepryl not having seen any difference on this medication . He is also off depakote with no behavioral issues at this time . There is intermittent neck pain attenuated with aleve . There is mild low back stiffness . He has sleep apnea being on nasal CPAP remaining on provigil. There are mild memory complaints . Significant medication provigil 200 mg po bid   . Previously on depakote , eldepryl azilect ,seroquel , sinemet and requip . Testing MRI of Head with nonspecific white matter signal changes in frontal lobe with mild ventricular enlargement with global volume loss and atrophy , June 2015 .   MRI of cevical sine mild mass effect on central cord at C4-5 and minor C5-6      Past Medical History:

## 2020-02-19 NOTE — PROGRESS NOTES
Subjective:      Patient ID: Sunita Quintana is a 59 y.o. male. HPI    Active problem progressive supranuclear palsy with rigidity, bradykinesia along with postural instability followed by [facility] . Cervical stenosis  . The condition is he reports is exercising lifting weights three times per week using also low lying bike during the summer  . He reports that he is having freezing getting stuck in place for about one minute mainly precicipitated on turning . He is not using walker intermittently having a falls mostly backwards . There maybe some choking having passed swallow study. He has been arranged to have another course of speech therapy through [facility] for dysarthria and swallowing issues . He is done with study drug . He has stopped eldepryl not having seen any difference on this medication . He is also off depakote with no behavioral issues at this time . There is intermittent neck pain attenuated with aleve . There is mild low back stiffness . He has sleep apnea being on nasal CPAP remaining on provigil. There are mild memory complaints . Significant medication provigil 200 mg po bid   . Previously on depakote , eldepryl azilect ,seroquel , sinemet and requip . Testing MRI of Head with nonspecific white matter signal changes in frontal lobe with mild ventricular enlargement with global volume loss and atrophy , June 2015 .   MRI of cevical sine mild mass effect on central cord at C4-5 and minor C5-6      Past Medical History:   Diagnosis Date    Cancer ([address]     skin & prostate    Glaucoma     Hyperlipidemia     Hypertension     Narcolepsy     Osteoarthritis     Parkinson disease ([address]     PSP (progressive supranuclear palsy) ([address] 05/05/2017    Sleep apnea        Past Surgical History:   Procedure Laterality Date    NOSE SURGERY  1973    PROSTATECTOMY  2012    radial    SHOULDER SURGERY Right 1998    TONSILLECTOMY AND ADENOIDECTOMY  1965       Family History   Problem Relation Age of Onset    High Blood Pressure Mother     Cancer Mother     High Blood Pressure Father     Diabetes Father     Heart Disease Father     Cancer Father         colon & liver       Social History     Socioeconomic History    Marital status:      Spouse name: None    Number of children: None    Years of education: None    Highest education level: None   Occupational History    None   Social Needs    Financial resource strain: None    Food insecurity:     Worry: None     Inability: None    Transportation needs:     Medical: None     Non-medical: None   Tobacco Use    Smoking status: Never Smoker    Smokeless tobacco: Never Used   Substance and Sexual Activity    Alcohol use: Yes     Alcohol/week: 0.0 standard drinks     Comment: occas    Drug use: No    Sexual activity: None   Lifestyle    Physical activity:     Days per week: None     Minutes per session: None    Stress: None   Relationships    Social connections:     Talks on phone: None     Gets together: None     Attends [employer] service: None     Active member of club or organization: None     Attends meetings of clubs or organizations: None     Relationship status: None    Intimate partner violence:     Fear of current or ex partner: None     Emotionally abused: None     Physically abused: None     Forced sexual activity: None   Other Topics Concern    None   Social History Narrative    None       Current Outpatient Medications   Medication Sig Dispense Refill    Armodafinil (NUVIGIL) 250 MG TABS Take 250 mg by mouth daily.       Oxymetazoline HCl (AFRIN NASAL SPRAY NA) by Nasal route      diphenhydrAMINE HCl (BENADRYL PO) Take 12.5 mg by mouth      melatonin 5 MG TABS tablet Take 5 mg by mouth daily      timolol (BETIMOL) 0.5 % ophthalmic solution 1 drop 2 times daily      brimonidine (ALPHAGAN) 0.2 % ophthalmic solution 1 drop 2 times daily       NAPROXEN PO Take 220 mg by mouth 2 times daily as needed      bilaterally  Cardiovascular: Auscultation of heart with regular rate and rhythm   Musculoskeletal. Muscle tone with minor rigidity                                                            Muscle strength 5/5 strength throughout                                                                                 No dysmetria or dysdiadokinesis  No tremor   Normal fine motor  Gait with reduced arm swing bilaterally with decrease en block turn with apraxia with truncal rigidity   Orientation Alert and oriented x 3   Short term memory normal  Attention and concentration normal   Language process and speech normal . No aphasia   Cranial nerve 2 normal acuety and visual fields  Cranial nerve 3, 4 and 6 . Saccadic pursuit. Decreased upward gaze  . Pupils are equal and reactive   Cranial nerve 5 . Normal strength of masseter and temporalis . Intact corneal reflex. Normal facial sensation  Cranial nerve 7 masked facies  Cranial nerve 8. Grossly intact hearing   Cranial nerve 9 and 10. Symmetric palate elevation   Cranial nerve 11 , 5 out of 5 strength   Cranial Nerve 12 midline tongue . No atrophy  Sensation . Normal proprioception . Intact Vibration . Normal pinprick and light touch   Deep Tendon Reflexes normal  Plantar response flexor bilaterally    Assessment:      1. Progressive supranuclear ophthalmoplegia (HCC)    2. Gait apraxia    3.  Dysphagia, unspecified type    He is to use walker at all times for support to proceed with speech therapy for dysphagia along with slurring      Plan:      As above

## 2020-02-20 NOTE — COMMUNICATION BODY
Relation Age of Onset    High Blood Pressure Mother     Cancer Mother     High Blood Pressure Father     Diabetes Father     Heart Disease Father     Cancer Father         colon & liver       Social History     Socioeconomic History    Marital status:      Spouse name: None    Number of children: None    Years of education: None    Highest education level: None   Occupational History    None   Social Needs    Financial resource strain: None    Food insecurity:     Worry: None     Inability: None    Transportation needs:     Medical: None     Non-medical: None   Tobacco Use    Smoking status: Never Smoker    Smokeless tobacco: Never Used   Substance and Sexual Activity    Alcohol use: Yes     Alcohol/week: 0.0 standard drinks     Comment: occas    Drug use: No    Sexual activity: None   Lifestyle    Physical activity:     Days per week: None     Minutes per session: None    Stress: None   Relationships    Social connections:     Talks on phone: None     Gets together: None     Attends Jainism service: None     Active member of club or organization: None     Attends meetings of clubs or organizations: None     Relationship status: None    Intimate partner violence:     Fear of current or ex partner: None     Emotionally abused: None     Physically abused: None     Forced sexual activity: None   Other Topics Concern    None   Social History Narrative    None       Current Outpatient Medications   Medication Sig Dispense Refill    Armodafinil (NUVIGIL) 250 MG TABS Take 250 mg by mouth daily.       Oxymetazoline HCl (AFRIN NASAL SPRAY NA) by Nasal route      diphenhydrAMINE HCl (BENADRYL PO) Take 12.5 mg by mouth      melatonin 5 MG TABS tablet Take 5 mg by mouth daily      timolol (BETIMOL) 0.5 % ophthalmic solution 1 drop 2 times daily      brimonidine (ALPHAGAN) 0.2 % ophthalmic solution 1 drop 2 times daily       NAPROXEN PO Take 220 mg by mouth 2 times daily as needed      MINOXIDIL, TOPICAL, 5 % SOLN Apply topically 2 times daily      Misc. Devices (ROLLER WALKER) MISC 1 each by Does not apply route daily With brakes and seat 1 each 0    VIAGRA 100 MG tablet       chlorthalidone (HYGROTON) 25 MG tablet Take 1 tablet by mouth daily      losartan (COZAAR) 100 MG tablet Take 100 mg by mouth daily.  pantoprazole (PROTONIX) 40 MG tablet   Take 40 mg by mouth daily as needed       simvastatin (ZOCOR) 40 MG tablet Take 40 mg by mouth nightly.  selegiline (ELDEPRYL) 5 MG capsule Take 1 capsule by mouth 2 times daily (before meals) (Patient not taking: Reported on 2/19/2020) 180 capsule 3    selegiline (ELDEPRYL) 5 MG tablet Take 5 mg by mouth 2 times daily      Misc. Devices [facility]) MISC Light wheelchair (Patient not taking: Reported on 2/19/2020) 1 each 0    divalproex (DEPAKOTE) 250 MG DR tablet Take 500 mg by mouth 2 times daily   2    modafinil (PROVIGIL) 200 MG tablet Take 200 mg by mouth 2 times daily       No current facility-administered medications for this visit. No Known Allergies      Review of Systems   Constitutional: Positive for fatigue. HENT: Negative. Respiratory: Positive for cough and shortness of breath. Cardiovascular: Negative. Gastrointestinal: Negative. Endocrine: Negative. Genitourinary: Positive for frequency and urgency. Musculoskeletal: Positive for arthralgias, back pain, gait problem, myalgias and neck pain. Skin: Negative. Allergic/Immunologic: Negative. Neurological: Positive for dizziness, tremors, speech difficulty, weakness and numbness. Hematological: Negative. Psychiatric/Behavioral: Negative. Objective:   Physical Exam    Vitals:    02/19/20 0920   BP: 132/83   Pulse: 74     weight: 218 lb 9.6 oz (99.2 kg)    Neurological Examination  Constitutional .General exam well groomed   Ears /Nose/Throat: external ear . Normal exam  Neck and thyroid . Normal size  Respiratory . Breathsounds clear

## 2020-08-25 ENCOUNTER — OFFICE VISIT (OUTPATIENT)
Dept: NEUROLOGY | Age: 65
End: 2020-08-25
Payer: COMMERCIAL

## 2020-08-25 VITALS
HEIGHT: 70 IN | BODY MASS INDEX: 30.06 KG/M2 | SYSTOLIC BLOOD PRESSURE: 124 MMHG | DIASTOLIC BLOOD PRESSURE: 78 MMHG | HEART RATE: 74 BPM | TEMPERATURE: 98.3 F | WEIGHT: 210 LBS

## 2020-08-25 PROCEDURE — 99214 OFFICE O/P EST MOD 30 MIN: CPT | Performed by: PSYCHIATRY & NEUROLOGY

## 2020-08-25 RX ORDER — CAFFEINE 200 MG
200 TABLET ORAL EVERY 4 HOURS PRN
COMMUNITY

## 2020-08-25 RX ORDER — AMLODIPINE BESYLATE 10 MG/1
10 TABLET ORAL DAILY
COMMUNITY

## 2020-08-25 RX ORDER — ATORVASTATIN CALCIUM 40 MG/1
40 TABLET, FILM COATED ORAL NIGHTLY
COMMUNITY

## 2020-08-25 RX ORDER — UBIDECARENONE 200 MG
4 CAPSULE ORAL DAILY
COMMUNITY

## 2020-08-25 ASSESSMENT — ENCOUNTER SYMPTOMS
BACK PAIN: 1
ALLERGIC/IMMUNOLOGIC NEGATIVE: 1
GASTROINTESTINAL NEGATIVE: 1
COUGH: 1
SHORTNESS OF BREATH: 1

## 2020-08-25 NOTE — PROGRESS NOTES
Subjective:      Patient ID: Shahbaz Ellington is a 59 y.o. male. HPI  Active problem progressive supranuclear palsy with rigidity, bradykinesia along with postural instability followed by [facility] . Cervical stenosis  to use walker at all times for support to proceed with speech therapy for dysphagia along with slurring  . The condition is he reports that walking and movement is tougher walking independently with baseline imbalance and shuffling falling about once per day . He has rollator although does not like it . There maybe some coughing wit eating with last swallow study being done in April . He reports that there maybe some forgetfulness . He has been seen by virtual medicine through [facility] referred for Center for [address] [place] [address]. He has sleep apnea being on nasal CPAP on nuvigil . Significant medication nuvigil 250 mg po qd  . Previously on depakote , eldepryl azilect ,seroquel , sinemet and requip . Testing MRI of Head with nonspecific white matter signal changes in frontal lobe with mild ventricular enlargement with global volume loss and atrophy , June 2015 .   MRI of cevical spine mild mass effect on central cord at C4-5 and minor C5-6      Past Medical History:   Diagnosis Date    Cancer ([address]     skin & prostate    Glaucoma     Hyperlipidemia     Hypertension     Narcolepsy     Osteoarthritis     Parkinson disease ([address]     PSP [facility] [address]anuclear [facility]y) [facility] [address] 05/05/2017    Sleep apnea        Past Surgical History:   Procedure Laterality Date    BROW LIFT  07/2020    Dr. Holly Sloan at [address]  2012    radial    SHOULDER SURGERY Right 1998    TONSILLECTOMY AND [address]       Family History   Problem Relation Age of Onset    High Blood Pressure Mother     Cancer Mother     High Blood Pressure Father     Diabetes Father     Heart Disease Father     Cancer Father         colon & liver       Social History     Socioeconomic History    Marital status:      Spouse name: None    Number of children: None    Years of education: None    Highest education level: None   Occupational History    None   Social Needs    Financial resource strain: None    Food insecurity     Worry: None     Inability: None    Transportation needs     Medical: None     Non-medical: None   Tobacco Use    Smoking status: Never Smoker    Smokeless tobacco: Never Used   Substance and Sexual Activity    Alcohol use: Yes     Alcohol/week: 0.0 standard drinks     Comment: occas    Drug use: No    Sexual activity: None   Lifestyle    Physical activity     Days per week: None     Minutes per session: None    Stress: None   Relationships    Social connections     Talks on phone: None     Gets together: None     Attends Jain service: None     Active member of club or organization: None     Attends meetings of clubs or organizations: None     Relationship status: None    Intimate partner violence     Fear of current or ex partner: None     Emotionally abused: None     Physically abused: None     Forced sexual activity: None   Other Topics Concern    None   Social History Narrative    None       Current Outpatient Medications   Medication Sig Dispense Refill    amLODIPine (NORVASC) 10 MG tablet Take 10 mg by mouth daily      atorvastatin (LIPITOR) 40 MG tablet Take 40 mg by mouth daily      Caffeine (VIVARIN) 200 MG TABS Take 200 mg by mouth every 4 hours as needed for Other      Coenzyme Q10 (CO Q10) 200 MG CAPS Take 4 capsules by mouth daily      Armodafinil (NUVIGIL) 250 MG TABS Take 250 mg by mouth daily.       Oxymetazoline HCl (AFRIN NASAL SPRAY NA) by Nasal route      diphenhydrAMINE HCl (BENADRYL PO) Take 12.5 mg by mouth      melatonin 5 MG TABS tablet Take 5 mg by mouth daily      timolol (BETIMOL) 0.5 % ophthalmic solution 1 drop 2 times daily      brimonidine (ALPHAGAN) 0.2 % ophthalmic solution 1 drop 2 times daily       NAPROXEN PO Take 220 mg by mouth 2 times daily as needed      Misc. Devices (ROLLER WALKER) MISC 1 each by Does not apply route daily With brakes and seat 1 each 0    Misc. Devices (WHEELCHAIR) MISC Light wheelchair 1 each 0    VIAGRA 100 MG tablet       chlorthalidone (HYGROTON) 25 MG tablet Take 1 tablet by mouth daily      pantoprazole (PROTONIX) 40 MG tablet   Take 40 mg by mouth daily as needed       MINOXIDIL, TOPICAL, 5 % SOLN Apply topically 2 times daily      losartan (COZAAR) 100 MG tablet Take 100 mg by mouth daily.  simvastatin (ZOCOR) 40 MG tablet Take 40 mg by mouth nightly. No current facility-administered medications for this visit. No Known Allergies      Review of Systems   Constitutional: Positive for fatigue. HENT: Negative. Respiratory: Positive for cough and shortness of breath. Cardiovascular: Negative. Gastrointestinal: Negative. Endocrine: Negative. Genitourinary: Positive for frequency and urgency. Musculoskeletal: Positive for arthralgias, back pain, gait problem, myalgias and neck pain. Skin: Negative. Allergic/Immunologic: Negative. Neurological: Positive for dizziness, tremors, speech difficulty, weakness and numbness. Hematological: Negative. Psychiatric/Behavioral: Negative. Objective:   Physical Exam  Vitals:    08/25/20 0922   BP: 124/78   Pulse: 74   Temp: 98.3 °F (36.8 °C)     weight: 210 lb (95.3 kg)    Neurological Examination  Constitutional .General exam well groomed   Ears /Nose/Throat: external ear . Normal exam  Neck and thyroid . Normal size  Respiratory . Breathsounds clear bilaterally  Cardiovascular:  Auscultation of heart with regular rate and rhythm   Musculoskeletal. Muscle tone with minor rigidity                                                            Muscle strength 5/5 strength throughout                                                                                 No dysmetria or dysdiadokinesis  No tremor   Normal fine motor  Gait with reduced arm swing bilaterally with decrease en block turn with apraxia with truncal rigidity   Orientation Alert and oriented x 3   Short term memory normal  Attention and concentration normal   Language process and speech normal . No aphasia   Cranial nerve 2 normal acuety and visual fields  Cranial nerve 3, 4 and 6 . Saccadic pursuit. Decreased upward gaze  . Pupils are equal and reactive   Cranial nerve 5 . Normal strength of masseter and temporalis . Intact corneal reflex. Normal facial sensation  Cranial nerve 7 masked facies  Cranial nerve 8. Grossly intact hearing   Cranial nerve 9 and 10. Symmetric palate elevation   Cranial nerve 11 , 5 out of 5 strength   Cranial Nerve 12 midline tongue . No atrophy  Sensation . Normal proprioception . Intact Vibration . Normal pinprick and light touch   Deep Tendon Reflexes normal  Plantar response flexor bilaterally    Assessment:      1. Progressive supranuclear ophthalmoplegia (HCC)    2. Gait apraxia    3.  Dysphagia, unspecified type    He is to use walker at all times for support to proceed with swallow study with dysphagia     Plan:      Orders Placed This Encounter   Procedures    FL MODIFIED BARIUM SWALLOW W VIDEO     Standing Status:   Future     Standing Expiration Date:   8/25/2021

## 2020-09-30 ENCOUNTER — TELEPHONE (OUTPATIENT)
Dept: NEUROLOGY | Age: 65
End: 2020-09-30

## 2020-09-30 NOTE — TELEPHONE ENCOUNTER
Michael Colbert called to let you know that Quincy Breezy had called the pharmacy requesting a refill on Lorsartan. The pharmacy called the PCP's office who told them that he is no longer on that medication. The pharmacy called Michael Colbert about this. She is concerned that he can not handle his medications on his own, but he has not wanted her to be involved in this. 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 He is not scheduled to be evaluated at [facility] until 12/28/2020. He tested positive for Covid 19 on 9/24/2020. She testing negative. Neither of them have Covid symptoms. Please advise.

## 2021-01-27 ENCOUNTER — OFFICE VISIT (OUTPATIENT)
Dept: NEUROLOGY | Age: 66
End: 2021-01-27
Payer: MEDICARE

## 2021-01-27 VITALS
DIASTOLIC BLOOD PRESSURE: 85 MMHG | HEIGHT: 70 IN | SYSTOLIC BLOOD PRESSURE: 125 MMHG | TEMPERATURE: 97.3 F | HEART RATE: 72 BPM | BODY MASS INDEX: 30.06 KG/M2 | WEIGHT: 210 LBS

## 2021-01-27 DIAGNOSIS — G23.1 PROGRESSIVE SUPRANUCLEAR OPHTHALMOPLEGIA (HCC): Primary | ICD-10-CM

## 2021-01-27 DIAGNOSIS — R48.2 GAIT APRAXIA: ICD-10-CM

## 2021-01-27 DIAGNOSIS — R13.10 DYSPHAGIA, UNSPECIFIED TYPE: ICD-10-CM

## 2021-01-27 PROCEDURE — 3017F COLORECTAL CA SCREEN DOC REV: CPT | Performed by: PSYCHIATRY & NEUROLOGY

## 2021-01-27 PROCEDURE — 4040F PNEUMOC VAC/ADMIN/RCVD: CPT | Performed by: PSYCHIATRY & NEUROLOGY

## 2021-01-27 PROCEDURE — 1123F ACP DISCUSS/DSCN MKR DOCD: CPT | Performed by: PSYCHIATRY & NEUROLOGY

## 2021-01-27 PROCEDURE — G8417 CALC BMI ABV UP PARAM F/U: HCPCS | Performed by: PSYCHIATRY & NEUROLOGY

## 2021-01-27 PROCEDURE — 1036F TOBACCO NON-USER: CPT | Performed by: PSYCHIATRY & NEUROLOGY

## 2021-01-27 PROCEDURE — G8427 DOCREV CUR MEDS BY ELIG CLIN: HCPCS | Performed by: PSYCHIATRY & NEUROLOGY

## 2021-01-27 PROCEDURE — 99214 OFFICE O/P EST MOD 30 MIN: CPT | Performed by: PSYCHIATRY & NEUROLOGY

## 2021-01-27 PROCEDURE — G8484 FLU IMMUNIZE NO ADMIN: HCPCS | Performed by: PSYCHIATRY & NEUROLOGY

## 2021-01-27 RX ORDER — TADALAFIL 20 MG/1
20 TABLET ORAL PRN
COMMUNITY

## 2021-01-27 ASSESSMENT — ENCOUNTER SYMPTOMS
COUGH: 1
ALLERGIC/IMMUNOLOGIC NEGATIVE: 1
GASTROINTESTINAL NEGATIVE: 1
BACK PAIN: 1
SHORTNESS OF BREATH: 1

## 2021-01-27 NOTE — PROGRESS NOTES
Subjective:      Patient ID: Catherine Logan is a 72 y.o. male. HPI  Active problem progressive supranuclear palsy with rigidity, bradykinesia along with postural instability followed by [facility] . Cervical stenosis . The condition is he had basal cell carcinoma surgery in brindge of his nose . He reports that his balance is getting worse not being in gym for 8 months . He is falling not as frequent by his report being less active not using rollator at home . He was evaluated at [facility] suggesting of cognitive rehabilitation . There is baseline imbalance and shuffling . Swallow study was done at [facility] which was normal having occasional coughing with eating . He reports that there maybe some forgetfulness . He has sleep apnea being on nasal CPAP on nuvigil . Significant medication nuvigil 250 mg po qd  . Previously on depakote , eldepryl azilect ,seroquel , sinemet and requip . Testing MRI of Head with nonspecific white matter signal changes in frontal lobe with mild ventricular enlargement with global volume loss and atrophy , June 2015 .   MRI of cevical spine mild mass effect on central cord at C4-5 and minor C5-6      Past Medical History:   Diagnosis Date    Cancer ([address]     skin & prostate    Glaucoma     Hyperlipidemia     Hypertension     Narcolepsy     Osteoarthritis     Parkinson disease ([address]     PSP (progressive supranuclear palsy) ([address] 05/05/2017    Sleep apnea        Past Surgical History:   Procedure Laterality Date    BROW LIFT  07/2020    Dr. Galvez Prior at [address]  2012    radial    SHOULDER SURGERY Right 1998    [address]ONSIL[address]OMY AND [address]       Family History   Problem Relation Age of Onset    High Blood Pressure Mother     Cancer Mother     High Blood Pressure Father     Diabetes Father     Heart Disease Father     Cancer Father         colon & liver       Social History     Socioeconomic History  Marital status:      Spouse name: None    Number of children: None    Years of education: None    Highest education level: None   Occupational History    None   Social Needs    Financial resource strain: None    Food insecurity     Worry: None     Inability: None    Transportation needs     Medical: None     Non-medical: None   Tobacco Use    Smoking status: Never Smoker    Smokeless tobacco: Never Used   Substance and Sexual Activity    Alcohol use: Yes     Alcohol/week: 0.0 standard drinks     Comment: occas    Drug use: No    Sexual activity: None   Lifestyle    Physical activity     Days per week: None     Minutes per session: None    Stress: None   Relationships    Social connections     Talks on phone: None     Gets together: None     Attends Anabaptist service: None     Active member of club or organization: None     Attends meetings of clubs or organizations: None     Relationship status: None    Intimate partner violence     Fear of current or ex partner: None     Emotionally abused: None     Physically abused: None     Forced sexual activity: None   Other Topics Concern    None   Social History Narrative    None       Current Outpatient Medications   Medication Sig Dispense Refill    tadalafil (CIALIS) 20 MG tablet Take 20 mg by mouth as needed for Erectile Dysfunction      amLODIPine (NORVASC) 10 MG tablet Take 10 mg by mouth daily      atorvastatin (LIPITOR) 40 MG tablet Take 40 mg by mouth daily      Caffeine (VIVARIN) 200 MG TABS Take 200 mg by mouth every 4 hours as needed for Other      Coenzyme Q10 (CO Q10) 200 MG CAPS Take 4 capsules by mouth daily      Armodafinil (NUVIGIL) 250 MG TABS Take 250 mg by mouth daily.       Oxymetazoline HCl (AFRIN NASAL SPRAY NA) by Nasal route      diphenhydrAMINE HCl (BENADRYL PO) Take 12.5 mg by mouth      melatonin 5 MG TABS tablet Take 5 mg by mouth daily      timolol (BETIMOL) 0.5 % ophthalmic solution 1 drop 2 times daily  brimonidine (ALPHAGAN) 0.2 % ophthalmic solution 1 drop 2 times daily       NAPROXEN PO Take 220 mg by mouth 2 times daily as needed      MINOXIDIL, TOPICAL, 5 % SOLN Apply topically 2 times daily      Misc. Devices (ROLLER WALKER) MISC 1 each by Does not apply route daily With brakes and seat 1 each 0    Misc. Devices (WHEELCHAIR) MISC Light wheelchair 1 each 0    VIAGRA 100 MG tablet       chlorthalidone (HYGROTON) 25 MG tablet Take 1 tablet by mouth daily      losartan (COZAAR) 100 MG tablet Take 100 mg by mouth daily.  pantoprazole (PROTONIX) 40 MG tablet   Take 40 mg by mouth daily as needed       simvastatin (ZOCOR) 40 MG tablet Take 40 mg by mouth nightly. No current facility-administered medications for this visit. No Known Allergies      Review of Systems   Constitutional: Positive for fatigue. HENT: Negative. Respiratory: Positive for cough and shortness of breath. Cardiovascular: Negative. Gastrointestinal: Negative. Endocrine: Negative. Genitourinary: Positive for frequency and urgency. Musculoskeletal: Positive for arthralgias, back pain, gait problem, myalgias and neck pain. Skin: Negative. Allergic/Immunologic: Negative. Neurological: Positive for dizziness, tremors, speech difficulty, weakness and numbness. Hematological: Negative. Psychiatric/Behavioral: Negative. Objective:   Physical Exam  Vitals:    01/27/21 0917   BP: 125/85   Pulse: 72   Temp: 97.3 °F (36.3 °C)     weight: 210 lb (95.3 kg)    Neurological Examination  Constitutional .General exam well groomed   Ears /Nose/Throat: external ear . Normal exam  Neck and thyroid . Normal size  Respiratory . Breathsounds clear bilaterally  Cardiovascular:  Auscultation of heart with regular rate and rhythm   Musculoskeletal. Muscle tone with minor rigidity                                                            Muscle strength 5/5 strength throughout No dysmetria or dysdiadokinesis  No tremor   Normal fine motor  Gait with reduced arm swing bilaterally with decrease en block turn with apraxia with truncal rigidity   Orientation Alert and oriented x 3 . Wednesday January, 27 , 2021 . WORLD kayla to spell fowards and backards . Serial 7 to 72   Short term memory normal  Attention and concentration normal   Language process and speech normal . No aphasia   Cranial nerve 2 normal acuety and visual fields  Cranial nerve 3, 4 and 6 . Saccadic pursuit. Decreased upward gaze  . Pupils are equal and reactive   Cranial nerve 5 . Normal strength of masseter and temporalis . Intact corneal reflex. Normal facial sensation  Cranial nerve 7 masked facies  Cranial nerve 8. Grossly intact hearing   Cranial nerve 9 and 10. Symmetric palate elevation   Cranial nerve 11 , 5 out of 5 strength   Cranial Nerve 12 midline tongue . No atrophy  Sensation . Normal proprioception . Intact Vibration . Normal pinprick and light touch   Deep Tendon Reflexes normal  Plantar response flexor bilaterally    Assessment:      1. Progressive supranuclear ophthalmoplegia (HCC)    2. Gait apraxia    3.  Dysphagia, unspecified type    He is to incorporate home yoga program to use rollator when up in his feet     Plan:      As above

## 2021-06-02 ENCOUNTER — OFFICE VISIT (OUTPATIENT)
Dept: NEUROLOGY | Age: 66
End: 2021-06-02
Payer: MEDICARE

## 2021-06-02 VITALS
BODY MASS INDEX: 31.21 KG/M2 | HEIGHT: 70 IN | SYSTOLIC BLOOD PRESSURE: 130 MMHG | WEIGHT: 218 LBS | HEART RATE: 76 BPM | TEMPERATURE: 97.4 F | DIASTOLIC BLOOD PRESSURE: 78 MMHG

## 2021-06-02 DIAGNOSIS — G23.1 PROGRESSIVE SUPRANUCLEAR OPHTHALMOPLEGIA (HCC): Primary | ICD-10-CM

## 2021-06-02 DIAGNOSIS — G47.33 OBSTRUCTIVE SLEEP APNEA SYNDROME: ICD-10-CM

## 2021-06-02 DIAGNOSIS — R48.2 GAIT APRAXIA: ICD-10-CM

## 2021-06-02 DIAGNOSIS — G25.81 RESTLESS LEGS SYNDROME: ICD-10-CM

## 2021-06-02 PROCEDURE — G8427 DOCREV CUR MEDS BY ELIG CLIN: HCPCS | Performed by: PSYCHIATRY & NEUROLOGY

## 2021-06-02 PROCEDURE — 3017F COLORECTAL CA SCREEN DOC REV: CPT | Performed by: PSYCHIATRY & NEUROLOGY

## 2021-06-02 PROCEDURE — 4040F PNEUMOC VAC/ADMIN/RCVD: CPT | Performed by: PSYCHIATRY & NEUROLOGY

## 2021-06-02 PROCEDURE — 1036F TOBACCO NON-USER: CPT | Performed by: PSYCHIATRY & NEUROLOGY

## 2021-06-02 PROCEDURE — G8417 CALC BMI ABV UP PARAM F/U: HCPCS | Performed by: PSYCHIATRY & NEUROLOGY

## 2021-06-02 PROCEDURE — 1123F ACP DISCUSS/DSCN MKR DOCD: CPT | Performed by: PSYCHIATRY & NEUROLOGY

## 2021-06-02 PROCEDURE — 99214 OFFICE O/P EST MOD 30 MIN: CPT | Performed by: PSYCHIATRY & NEUROLOGY

## 2021-06-02 RX ORDER — ROPINIROLE 0.5 MG/1
TABLET, FILM COATED ORAL
Qty: 30 TABLET | Refills: 5 | Status: SHIPPED | OUTPATIENT
Start: 2021-06-02

## 2021-06-02 ASSESSMENT — ENCOUNTER SYMPTOMS
ALLERGIC/IMMUNOLOGIC NEGATIVE: 1
SHORTNESS OF BREATH: 1
GASTROINTESTINAL NEGATIVE: 1
BACK PAIN: 1
COUGH: 1

## 2021-06-02 NOTE — PROGRESS NOTES
Subjective:      Patient ID: Urbano Holguin is a 72 y.o. male. HPI  Active problem progressive supranuclear palsy with rigidity, bradykinesia along with postural instability followed by [facility] . Cervical stenosis . The condition is he is not using rollator . He reports that ongoing imbalance falling one to two times per day to larger degree falling backward . He is going to gym twice per week using therapy pool . He was evaluated at [facility] memory center having neuropsychological study which shows general ability in the average range . Swallow study was done at [facility] which was normal having occasional coughing with eating . He reports that there maybe some forgetfulness . He is having restless legs towards evening when he sits down not much of an issue at night  . He has sleep apnea being on nasal CPAP on nuvigil . Significant medication nuvigil 250 mg po qd  . Previously on depakote , eldepryl azilect ,seroquel , sinemet and requip . Testing MRI of Head with nonspecific white matter signal changes in frontal lobe with mild ventricular enlargement with global volume loss and atrophy , June 2015 . MRI of cevical spine mild mass effect on central cord at C4-5 and minor C5-6 .  Neuropsychological study general ability in the average range      Past Medical History:   Diagnosis Date    Cancer ([address]     skin & prostate    Glaucoma     Hyperlipidemia     Hypertension     Narcolepsy     Osteoarthritis     Parkinson disease ([address]     PSP [facility]anuclear [facility] 05/05/2017    Sleep apnea        Past Surgical History:   Procedure Laterality Date    BROW LIFT  07/2020    Dr. Og Andrew at [address]  2012    radial    SHOULDER SURGERY Right 1998    TONSIL[address]OMY AND [address]       Family History   Problem Relation Age of Onset    High Blood Pressure Mother     Cancer Mother     High Blood Pressure Father     Diabetes Father  Heart Disease Father     Cancer Father         colon & liver       Social History     Socioeconomic History    Marital status:      Spouse name: None    Number of children: None    Years of education: None    Highest education level: None   Occupational History    None   Tobacco Use    Smoking status: Never Smoker    Smokeless tobacco: Never Used   Vaping Use    Vaping Use: Never used   Substance and Sexual Activity    Alcohol use: Yes     Alcohol/week: 0.0 standard drinks     Comment: occas    Drug use: No    Sexual activity: None   Other Topics Concern    None   Social History Narrative    None     Social Determinants of Health     Financial Resource Strain:     Difficulty of Paying Living Expenses:    Food Insecurity:     Worried About Running Out of Food in the Last Year:     [address] in the Last Year:    Transportation Needs:     Lack of Transportation (Medical):      Lack of Transportation (Non-Medical):    Physical Activity:     Days of Exercise per Week:     Minutes of Exercise per Session:    Stress:     Feeling of Stress :    Social Connections:     Frequency of Communication with Friends and Family:     Frequency of Social Gatherings with Friends and Family:     Attends Alevism Services:     Active Member of Clubs or Organizations:     Attends Club or Organization Meetings:     Marital Status:    Intimate Partner Violence:     Fear of Current or Ex-Partner:     Emotionally Abused:     Physically Abused:     Sexually Abused:        Current Outpatient Medications   Medication Sig Dispense Refill    rOPINIRole (REQUIP) 0.5 MG tablet Take 1 po q 6 PM 30 tablet 5    tadalafil (CIALIS) 20 MG tablet Take 20 mg by mouth as needed for Erectile Dysfunction      amLODIPine (NORVASC) 10 MG tablet Take 10 mg by mouth daily      atorvastatin (LIPITOR) 40 MG tablet Take 40 mg by mouth daily      Caffeine (VIVARIN) 200 MG TABS Take 200 mg by mouth every 4 hours as needed for Other      Coenzyme Q10 (CO Q10) 200 MG CAPS Take 4 capsules by mouth daily      Armodafinil (NUVIGIL) 250 MG TABS Take 250 mg by mouth daily.  Oxymetazoline HCl (AFRIN NASAL SPRAY NA) by Nasal route      melatonin 5 MG TABS tablet Take 5 mg by mouth daily      timolol (BETIMOL) 0.5 % ophthalmic solution 1 drop 2 times daily      brimonidine (ALPHAGAN) 0.2 % ophthalmic solution 1 drop 2 times daily       NAPROXEN PO Take 220 mg by mouth 2 times daily as needed      MINOXIDIL, TOPICAL, 5 % SOLN Apply topically 2 times daily      Misc. Devices (ROLLER WALKER) MISC 1 each by Does not apply route daily With brakes and seat 1 each 0    Misc. Devices (WHEELCHAIR) MISC Light wheelchair 1 each 0    VIAGRA 100 MG tablet       losartan (COZAAR) 100 MG tablet Take 100 mg by mouth daily.  pantoprazole (PROTONIX) 40 MG tablet   Take 40 mg by mouth daily as needed       diphenhydrAMINE HCl (BENADRYL PO) Take 12.5 mg by mouth      chlorthalidone (HYGROTON) 25 MG tablet Take 1 tablet by mouth daily      simvastatin (ZOCOR) 40 MG tablet Take 40 mg by mouth nightly. No current facility-administered medications for this visit. No Known Allergies      Review of Systems   Constitutional: Positive for fatigue. HENT: Negative. Respiratory: Positive for cough and shortness of breath. Cardiovascular: Negative. Gastrointestinal: Negative. Endocrine: Negative. Genitourinary: Positive for frequency and urgency. Musculoskeletal: Positive for arthralgias, back pain, gait problem, myalgias and neck pain. Skin: Negative. Allergic/Immunologic: Negative. Neurological: Positive for dizziness, tremors, speech difficulty, weakness and numbness. Hematological: Negative. Psychiatric/Behavioral: Negative.         Objective:   Physical Exam  Vitals:    06/02/21 0858   BP: 130/78   Pulse: 76   Temp: 97.4 °F (36.3 °C)     weight: 218 lb (98.9 kg)    Neurological

## 2021-10-12 ENCOUNTER — OFFICE VISIT (OUTPATIENT)
Dept: NEUROLOGY | Age: 66
End: 2021-10-12
Payer: MEDICARE

## 2021-10-12 VITALS
SYSTOLIC BLOOD PRESSURE: 135 MMHG | HEART RATE: 78 BPM | WEIGHT: 220 LBS | BODY MASS INDEX: 31.5 KG/M2 | HEIGHT: 70 IN | DIASTOLIC BLOOD PRESSURE: 87 MMHG

## 2021-10-12 DIAGNOSIS — G25.81 RESTLESS LEGS SYNDROME: ICD-10-CM

## 2021-10-12 DIAGNOSIS — G47.33 OBSTRUCTIVE SLEEP APNEA SYNDROME: ICD-10-CM

## 2021-10-12 DIAGNOSIS — R48.2 GAIT APRAXIA: ICD-10-CM

## 2021-10-12 DIAGNOSIS — G23.1 PROGRESSIVE SUPRANUCLEAR OPHTHALMOPLEGIA (HCC): Primary | ICD-10-CM

## 2021-10-12 PROCEDURE — G8417 CALC BMI ABV UP PARAM F/U: HCPCS | Performed by: PSYCHIATRY & NEUROLOGY

## 2021-10-12 PROCEDURE — 99214 OFFICE O/P EST MOD 30 MIN: CPT | Performed by: PSYCHIATRY & NEUROLOGY

## 2021-10-12 PROCEDURE — 1036F TOBACCO NON-USER: CPT | Performed by: PSYCHIATRY & NEUROLOGY

## 2021-10-12 PROCEDURE — G8427 DOCREV CUR MEDS BY ELIG CLIN: HCPCS | Performed by: PSYCHIATRY & NEUROLOGY

## 2021-10-12 PROCEDURE — 1123F ACP DISCUSS/DSCN MKR DOCD: CPT | Performed by: PSYCHIATRY & NEUROLOGY

## 2021-10-12 PROCEDURE — G8484 FLU IMMUNIZE NO ADMIN: HCPCS | Performed by: PSYCHIATRY & NEUROLOGY

## 2021-10-12 PROCEDURE — 4040F PNEUMOC VAC/ADMIN/RCVD: CPT | Performed by: PSYCHIATRY & NEUROLOGY

## 2021-10-12 PROCEDURE — 3017F COLORECTAL CA SCREEN DOC REV: CPT | Performed by: PSYCHIATRY & NEUROLOGY

## 2021-10-12 RX ORDER — GABAPENTIN 300 MG/1
300 CAPSULE ORAL
COMMUNITY
Start: 2021-07-15

## 2021-10-12 ASSESSMENT — ENCOUNTER SYMPTOMS
SHORTNESS OF BREATH: 1
BACK PAIN: 1
ALLERGIC/IMMUNOLOGIC NEGATIVE: 1
GASTROINTESTINAL NEGATIVE: 1
COUGH: 1

## 2021-10-12 NOTE — PROGRESS NOTES
Subjective:      Patient ID: Sean Pena is a 72 y.o. male. HPI  Active problem progressive supranuclear palsy with rigidity, bradykinesia along with postural instability followed in conjunction with [facility] . Cervical stenosis . Restless legs placed on requip. The condition is he is using rollator outside house at home using wall and furniture for support . He reports that ongoing imbalance falling one to two times per day to larger degree falling backward . He was evaluated at [facility] memory center having neuropsychological study which shows general ability in the average range . Swallow study was done at [facility] which was normal having occasional coughing with eating . He reports that there maybe some forgetfulness . Restless legs are attenuated on neurontin 600 mg po qhs changed at [facility] not having issue now with restless legs in evening or night . He was having right hip pain injected by orthopedic physician that has helped hip pain  . He has sleep apnea having not tolerated nasal CPAP machine on nuvigil 250 mg po qd  . Significant medication nuvigil 250 mg po qd, neurontin 600 mg qhs . Previously on depakote , eldepryl azilect ,seroquel , sinemet and requip . Testing MRI of Head with nonspecific white matter signal changes in frontal lobe with mild ventricular enlargement with global volume loss and atrophy , June 2015 . MRI of cevical spine mild mass effect on central cord at C4-5 and minor C5-6 .  Neuropsychological study general ability in the average range      Past Medical History:   Diagnosis Date    Cancer [facility])     skin & prostate    Glaucoma     Hyperlipidemia     Hypertension     Narcolepsy     Osteoarthritis     Parkinson disease ([address]     PSP (progressive supranuclear palsy) ([address] 05/05/2017    Sleep apnea        Past Surgical History:   Procedure Laterality Date    BROW LIFT  07/2020    Dr. Joon Diamond at [address] [place] [address] Street 2012    radial    SHOULDER SURGERY Right 1998    TONSILLECTOMY AND ADENOIDECTOMY  1965       Family History   Problem Relation Age of Onset    High Blood Pressure Mother     Cancer Mother     High Blood Pressure Father     Diabetes Father     Heart Disease Father     Cancer Father         colon & liver       Social History     Socioeconomic History    Marital status:      Spouse name: None    Number of children: None    Years of education: None    Highest education level: None   Occupational History    None   Tobacco Use    Smoking status: Never Smoker    Smokeless tobacco: Never Used   Vaping Use    Vaping Use: Never used   Substance and Sexual Activity    Alcohol use: Yes     Alcohol/week: 0.0 standard drinks     Comment: occas    Drug use: No    Sexual activity: None   Other Topics Concern    None   Social History Narrative    None     Social Determinants of Health     Financial Resource Strain:     Difficulty of Paying Living Expenses:    Food Insecurity:     Worried About Running Out of Food in the Last Year:     [address] in the Last Year:    Transportation Needs:     Lack of Transportation (Medical):  Lack of Transportation (Non-Medical):    Physical Activity:     Days of Exercise per Week:     Minutes of Exercise per Session:    Stress:     Feeling of Stress :    Social Connections:     Frequency of Communication with Friends and Family:     Frequency of Social Gatherings with Friends and Family:     Attends Moravian Services:     Active Member of Clubs or Organizations:     Attends Club or Organization Meetings:     Marital Status:    Intimate Partner Violence:     Fear of Current or Ex-Partner:     Emotionally Abused:     Physically Abused:     Sexually Abused:        Current Outpatient Medications   Medication Sig Dispense Refill    gabapentin (NEURONTIN) 300 MG capsule Take 300 mg by mouth. Take two capsules before bedtime.       Carboxymethylcell-Hypromellose (Jose Bonner OP) Apply to eye nightly      tadalafil (CIALIS) 20 MG tablet Take 20 mg by mouth as needed for Erectile Dysfunction      amLODIPine (NORVASC) 10 MG tablet Take 10 mg by mouth daily      atorvastatin (LIPITOR) 40 MG tablet Take 40 mg by mouth nightly       Caffeine (VIVARIN) 200 MG TABS Take 200 mg by mouth every 4 hours as needed for Other      Coenzyme Q10 (CO Q10) 200 MG CAPS Take 4 capsules by mouth daily      Armodafinil (NUVIGIL) 250 MG TABS Take 250 mg by mouth daily.  Oxymetazoline HCl (AFRIN NASAL SPRAY NA) by Nasal route      diphenhydrAMINE HCl (BENADRYL PO) Take 12.5 mg by mouth      melatonin 5 MG TABS tablet Take 5 mg by mouth nightly       timolol (BETIMOL) 0.5 % ophthalmic solution 1 drop 2 times daily      brimonidine (ALPHAGAN) 0.2 % ophthalmic solution 1 drop 2 times daily       NAPROXEN PO Take 220 mg by mouth 2 times daily as needed      MINOXIDIL, TOPICAL, 5 % SOLN Apply topically 2 times daily      Misc. Devices (ROLLER WALKER) MISC 1 each by Does not apply route daily With brakes and seat 1 each 0    Misc. Devices (WHEELCHAIR) MISC Light wheelchair 1 each 0    VIAGRA 100 MG tablet nightly as needed       losartan (COZAAR) 100 MG tablet Take 100 mg by mouth daily.  pantoprazole (PROTONIX) 40 MG tablet   Take 40 mg by mouth daily as needed       rOPINIRole (REQUIP) 0.5 MG tablet Take 1 po q 6 PM 30 tablet 5    chlorthalidone (HYGROTON) 25 MG tablet Take 1 tablet by mouth daily      simvastatin (ZOCOR) 40 MG tablet Take 40 mg by mouth nightly. No current facility-administered medications for this visit. No Known Allergies      Review of Systems   Constitutional: Positive for fatigue. HENT: Negative. Respiratory: Positive for cough and shortness of breath. Cardiovascular: Negative. Gastrointestinal: Negative. Endocrine: Negative. Genitourinary: Positive for frequency and urgency.    Musculoskeletal: Positive for arthralgias, back pain, gait problem, myalgias and neck pain. Skin: Negative. Allergic/Immunologic: Negative. Neurological: Positive for dizziness, tremors, speech difficulty, weakness and numbness. Hematological: Negative. Psychiatric/Behavioral: Negative. Objective:   Physical Exam  Vitals:    10/12/21 0916   BP: 135/87   Pulse: 78     weight: 220 lb (99.8 kg)    Neurological Examination  Constitutional .General exam well groomed   Ears /Nose/Throat: external ear . Normal exam  Neck and thyroid . Normal size  Respiratory . Breathsounds clear bilaterally  Cardiovascular: Auscultation of heart with regular rate and rhythm   Musculoskeletal. Muscle tone with minor rigidity                                                            Muscle strength 5/5 strength throughout                                                                                 No dysmetria or dysdiadokinesis  No tremor   Normal fine motor  Gait with reduced arm swing bilaterally with decrease en block turn with apraxia with truncal rigidity   Orientation Alert and oriented x 3 . WORLD kayla to spell fowards and backards . Serial 7 to 72   Short term memory normal  Attention and concentration normal   Language process and speech normal . No aphasia   Cranial nerve 2 normal acuety and visual fields  Cranial nerve 3, 4 and 6 . Saccadic pursuit. Decreased upward gaze  . Pupils are equal and reactive   Cranial nerve 5 . Normal strength of masseter and temporalis . Intact corneal reflex. Normal facial sensation  Cranial nerve 7 masked facies  Cranial nerve 8. Grossly intact hearing   Cranial nerve 9 and 10. Symmetric palate elevation   Cranial nerve 11 , 5 out of 5 strength   Cranial Nerve 12 midline tongue . No atrophy  Sensation . Normal proprioception . Intact Vibration . Normal pinprick and light touch   Deep Tendon Reflexes normal  Plantar response flexor bilaterally    Assessment:      1.  Progressive supranuclear ophthalmoplegia (HCC)    2. Gait apraxia    3. Restless legs syndrome    4.  Obstructive sleep apnea syndrome    He is to use rollator on regular basis to continue current medication regimen       Plan:      As above

## 2022-05-18 ENCOUNTER — OFFICE VISIT (OUTPATIENT)
Dept: NEUROLOGY | Age: 67
End: 2022-05-18
Payer: MEDICARE

## 2022-05-18 VITALS
BODY MASS INDEX: 30.64 KG/M2 | SYSTOLIC BLOOD PRESSURE: 121 MMHG | WEIGHT: 214 LBS | HEIGHT: 70 IN | DIASTOLIC BLOOD PRESSURE: 83 MMHG | HEART RATE: 69 BPM

## 2022-05-18 DIAGNOSIS — G23.1 PROGRESSIVE SUPRANUCLEAR OPHTHALMOPLEGIA (HCC): Primary | ICD-10-CM

## 2022-05-18 DIAGNOSIS — G25.81 RESTLESS LEGS SYNDROME: ICD-10-CM

## 2022-05-18 DIAGNOSIS — R48.2 GAIT APRAXIA: ICD-10-CM

## 2022-05-18 PROCEDURE — G8427 DOCREV CUR MEDS BY ELIG CLIN: HCPCS | Performed by: PSYCHIATRY & NEUROLOGY

## 2022-05-18 PROCEDURE — G8417 CALC BMI ABV UP PARAM F/U: HCPCS | Performed by: PSYCHIATRY & NEUROLOGY

## 2022-05-18 PROCEDURE — 1123F ACP DISCUSS/DSCN MKR DOCD: CPT | Performed by: PSYCHIATRY & NEUROLOGY

## 2022-05-18 PROCEDURE — 99214 OFFICE O/P EST MOD 30 MIN: CPT | Performed by: PSYCHIATRY & NEUROLOGY

## 2022-05-18 PROCEDURE — 3017F COLORECTAL CA SCREEN DOC REV: CPT | Performed by: PSYCHIATRY & NEUROLOGY

## 2022-05-18 PROCEDURE — 1036F TOBACCO NON-USER: CPT | Performed by: PSYCHIATRY & NEUROLOGY

## 2022-05-18 PROCEDURE — 4040F PNEUMOC VAC/ADMIN/RCVD: CPT | Performed by: PSYCHIATRY & NEUROLOGY

## 2022-05-18 RX ORDER — DEXTRAN 70/HYPROMELLOSE
DROPS OPHTHALMIC (EYE)
COMMUNITY

## 2022-05-18 RX ORDER — MELOXICAM 15 MG/1
15 TABLET ORAL DAILY
COMMUNITY
Start: 2022-03-09

## 2022-05-18 RX ORDER — TROSPIUM CHLORIDE 20 MG/1
20 TABLET, FILM COATED ORAL 2 TIMES DAILY
COMMUNITY

## 2022-05-18 RX ORDER — CARBOXYMETHYLCELLULOSE SODIUM, GLYCERIN, AND POLYSORBATE 80 5; 10; 5 MG/ML; MG/ML; MG/ML
SOLUTION/ DROPS OPHTHALMIC
COMMUNITY

## 2022-05-18 ASSESSMENT — ENCOUNTER SYMPTOMS
GASTROINTESTINAL NEGATIVE: 1
BACK PAIN: 1
SHORTNESS OF BREATH: 1
COUGH: 1
ALLERGIC/IMMUNOLOGIC NEGATIVE: 1

## 2022-05-18 NOTE — PROGRESS NOTES
Subjective:      Patient ID: Patricia Merritt is a 77 y.o. male. HPI  Active problem progressive supranuclear palsy with rigidity, bradykinesia along with postural instability followed in conjunction with [facility] . Cervical stenosis . Restless legs placed on requip. The condition is he is living now in first floor at home walking with U step using walker outside of house. U step is heavier and sturdier falling about 2 to 3 times per week having had home therapy falling less transitioning to OTP PT. He using through speech therapy respiratoty muscle strength  with wife reporting that his speech and voice quality is stronger being louder and clearer. There will be occasional choking more with liquids reporting swallowing techniques have helped . There is some drooling which will be variable . He started new drug trial through [facility] [employer] in [facility]. He was evaluated at [facility] memory center having neuropsychological study which shows general ability in the average range . Swallow study was done at [facility] which was normal having occasional coughing with eating . He reports that there maybe some forgetfulness . Restless legs are attenuated on neurontin 600 mg po qhs changed at [facility] not having issue now with restless legs in evening or night . He was having right hip pain that has been injected by orthopedic physician in the past placed on mobic 15 mg po qd   . He has sleep apnea having not tolerated nasal CPAP machine on nuvigil 250 mg po qd with partial effect in attenuating daytime sleepiness. Significant medication nuvigil 250 mg po qd, neurontin 600 mg qhs . Previously on depakote , eldepryl azilect ,seroquel , sinemet and requip . Testing MRI of Head with nonspecific white matter signal changes in frontal lobe with mild ventricular enlargement with global volume loss and atrophy , June 2015 .   MRI of cevical spine mild mass effect on central cord at C4-5 and minor C5-6 . Neuropsychological study general ability in the average range      Past Medical History:   Diagnosis Date    Cancer ([facility]     skin & prostate    Glaucoma     Hyperlipidemia     Hypertension     Narcolepsy     Osteoarthritis     Parkinson disease ([facility]     PSP (progressive supranuclear palsy) ([facility] 05/05/2017    Sleep apnea        Past Surgical History:   Procedure Laterality Date    BROW LIFT  07/2020    Dr. Scar Frias at [address]  2012    radial    SHOULDER SURGERY Right 1998    [facility]IL[facility]OMY AND [facility]       Family History   Problem Relation Age of Onset    High Blood Pressure Mother     Cancer Mother     High Blood Pressure Father     Diabetes Father     Heart Disease Father     Cancer Father         colon & liver       Social History     Socioeconomic History    Marital status:      Spouse name: None    Number of children: None    Years of education: None    Highest education level: None   Occupational History    None   Tobacco Use    Smoking status: Never Smoker    Smokeless tobacco: Never Used   Vaping Use    Vaping Use: Never used   Substance and Sexual Activity    Alcohol use: Yes     Alcohol/week: 0.0 standard drinks     Comment: occas    Drug use: No    Sexual activity: None   Other Topics Concern    None   Social History Narrative    None     Social Determinants of Health     Financial Resource Strain:     Difficulty of Paying Living Expenses: Not on file   Food Insecurity:     Worried About Running Out of Food in the Last Year: Not on file    Helder of Food in the Last Year: Not on file   Transportation Needs:     Lack of Transportation (Medical): Not on file    Lack of Transportation (Non-Medical):  Not on file   Physical Activity:     Days of Exercise per Week: Not on file    Minutes of Exercise per Session: Not on file   Stress:     Feeling of Stress : Not on file   Social Connections:     Frequency of Communication with Friends and Family: Not on file    Frequency of Social Gatherings with Friends and Family: Not on file    Attends Rastafari Services: Not on file    Active Member of Clubs or Organizations: Not on file    Attends Club or Organization Meetings: Not on file    Marital Status: Not on file   Intimate Partner Violence:     Fear of Current or Ex-Partner: Not on file    Emotionally Abused: Not on file    Physically Abused: Not on file    Sexually Abused: Not on file   Housing Stability:     Unable to Pay for Housing in the Last Year: Not on file    Number of [place] in the Last Year: Not on file    Unstable Housing in the Last Year: Not on file       Current Outpatient Medications   Medication Sig Dispense Refill    meloxicam (MOBIC) 15 MG tablet Take 15 mg by mouth daily       trospium (SANCTURA) 20 MG tablet Take 20 mg by mouth 2 times daily      Artificial Tear Solution (JUST TEARS EYE DROPS) SOLN GenTeal Tears Moderate 0.1 %-0.3 %-0.2 % eye drops   one appication at bedtime      Carboxymeth-Glycerin-Polysorb (REFRESH OPTIVE ADVANCED) 0.5-1-0.5 % SOLN Apply to eye      ciclopirox (PENLAC) 8 % solution Apply topically nightly Apply topically nightly.  gabapentin (NEURONTIN) 300 MG capsule Take 300 mg by mouth. Take two capsules before bedtime.  Carboxymethylcell-Hypromellose (GENTEAL OP) Apply to eye nightly      tadalafil (CIALIS) 20 MG tablet Take 20 mg by mouth as needed for Erectile Dysfunction      amLODIPine (NORVASC) 10 MG tablet Take 10 mg by mouth daily      atorvastatin (LIPITOR) 40 MG tablet Take 40 mg by mouth nightly       Caffeine (VIVARIN) 200 MG TABS Take 200 mg by mouth every 4 hours as needed for Other      Coenzyme Q10 (CO Q10) 200 MG CAPS Take 4 capsules by mouth daily      Armodafinil (NUVIGIL) 250 MG TABS Take 250 mg by mouth daily.       Oxymetazoline HCl (AFRIN NASAL SPRAY NA) by Nasal route      diphenhydrAMINE HCl (BENADRYL PO) Take 12.5 mg by mouth      melatonin 5 MG TABS tablet Take 5 mg by mouth nightly       timolol (BETIMOL) 0.5 % ophthalmic solution 1 drop 2 times daily      brimonidine (ALPHAGAN) 0.2 % ophthalmic solution 1 drop 2 times daily       NAPROXEN PO Take 220 mg by mouth 2 times daily as needed      MINOXIDIL, TOPICAL, 5 % SOLN Apply topically 2 times daily      Misc. Devices (ROLLER WALKER) MISC 1 each by Does not apply route daily With brakes and seat 1 each 0    Misc. Devices (WHEELCHAIR) MISC Light wheelchair 1 each 0    VIAGRA 100 MG tablet nightly as needed       losartan (COZAAR) 100 MG tablet Take 100 mg by mouth daily.  pantoprazole (PROTONIX) 40 MG tablet   Take 40 mg by mouth daily as needed       rOPINIRole (REQUIP) 0.5 MG tablet Take 1 po q 6 PM 30 tablet 5    chlorthalidone (HYGROTON) 25 MG tablet Take 1 tablet by mouth daily      simvastatin (ZOCOR) 40 MG tablet Take 40 mg by mouth nightly. No current facility-administered medications for this visit. Allergies   Allergen Reactions    Amantadine          Review of Systems   Constitutional: Positive for fatigue. HENT: Negative. Respiratory: Positive for cough and shortness of breath. Cardiovascular: Negative. Gastrointestinal: Negative. Endocrine: Negative. Genitourinary: Positive for frequency and urgency. Musculoskeletal: Positive for arthralgias, back pain, gait problem, myalgias and neck pain. Skin: Negative. Allergic/Immunologic: Negative. Neurological: Positive for dizziness, tremors, speech difficulty, weakness and numbness. Hematological: Negative. Psychiatric/Behavioral: Negative. Objective:   Physical Exam  Vitals:    05/18/22 0907   BP: 121/83   Pulse: 69     weight: 214 lb (97.1 kg)    Neurological Examination  Constitutional .General exam well groomed   Ears /Nose/Throat: external ear . Normal exam  Neck and thyroid . Normal size  Respiratory . Breathsounds clear bilaterally  Cardiovascular: Auscultation of heart with regular rate and rhythm   Musculoskeletal. Muscle tone with minor rigidity                                                            Muscle strength 5/5 strength throughout                                                                                 No dysmetria or dysdiadokinesis  No tremor   Normal fine motor  Gait with reduced arm swing bilaterally with decrease en block turn with apraxia with truncal rigidity   Orientation Alert and oriented x 3 . WORLD kayla to spell fowards and backards . Serial 7 to 72   Short term memory normal  Attention and concentration normal   Language process and speech normal . No aphasia   Cranial nerve 2 normal acuety and visual fields  Cranial nerve 3, 4 and 6 . Saccadic pursuit. Decreased upward gaze  . Pupils are equal and reactive   Cranial nerve 5 . Normal strength of masseter and temporalis . Intact corneal reflex. Normal facial sensation  Cranial nerve 7 masked facies  Cranial nerve 8. Grossly intact hearing   Cranial nerve 9 and 10. Symmetric palate elevation   Cranial nerve 11 , 5 out of 5 strength   Cranial Nerve 12 midline tongue . No atrophy  Sensation . Normal proprioception . Intact Vibration . Normal pinprick and light touch   Deep Tendon Reflexes normal  Plantar response flexor bilaterally    Assessment:      1. Progressive supranuclear ophthalmoplegia (HCC)    2. Gait apraxia    3.  Restless legs syndrome    He is to continue current medication regimen       Plan:      As above

## 2023-03-15 ENCOUNTER — OFFICE VISIT (OUTPATIENT)
Dept: PODIATRY | Age: 68
End: 2023-03-15
Payer: MEDICARE

## 2023-03-15 VITALS — WEIGHT: 214 LBS | BODY MASS INDEX: 30.64 KG/M2 | HEIGHT: 70 IN

## 2023-03-15 DIAGNOSIS — M79.671 PAIN IN BOTH FEET: ICD-10-CM

## 2023-03-15 DIAGNOSIS — B35.1 DERMATOPHYTOSIS OF NAIL: ICD-10-CM

## 2023-03-15 DIAGNOSIS — M79.672 PAIN IN BOTH FEET: ICD-10-CM

## 2023-03-15 DIAGNOSIS — I73.9 PVD (PERIPHERAL VASCULAR DISEASE) (HCC): Primary | ICD-10-CM

## 2023-03-15 DIAGNOSIS — M72.2 PLANTAR FASCIAL FIBROMATOSIS: ICD-10-CM

## 2023-03-15 PROCEDURE — 11721 DEBRIDE NAIL 6 OR MORE: CPT | Performed by: PODIATRIST

## 2023-03-15 PROCEDURE — 3017F COLORECTAL CA SCREEN DOC REV: CPT | Performed by: PODIATRIST

## 2023-03-15 PROCEDURE — G8484 FLU IMMUNIZE NO ADMIN: HCPCS | Performed by: PODIATRIST

## 2023-03-15 PROCEDURE — 99203 OFFICE O/P NEW LOW 30 MIN: CPT | Performed by: PODIATRIST

## 2023-03-15 PROCEDURE — G8417 CALC BMI ABV UP PARAM F/U: HCPCS | Performed by: PODIATRIST

## 2023-03-15 PROCEDURE — 1123F ACP DISCUSS/DSCN MKR DOCD: CPT | Performed by: PODIATRIST

## 2023-03-15 PROCEDURE — G8427 DOCREV CUR MEDS BY ELIG CLIN: HCPCS | Performed by: PODIATRIST

## 2023-03-15 PROCEDURE — 1036F TOBACCO NON-USER: CPT | Performed by: PODIATRIST

## 2023-03-15 RX ORDER — TIMOLOL MALEATE 5 MG/ML
SOLUTION/ DROPS OPHTHALMIC
COMMUNITY
Start: 2023-01-13

## 2023-03-15 RX ORDER — METHYLPHENIDATE HYDROCHLORIDE 20 MG/1
TABLET ORAL 2 TIMES DAILY
COMMUNITY
Start: 2023-01-18

## 2023-03-15 NOTE — PROGRESS NOTES
stretching- stretch at least twice a day. See below    Wall Stretch        Affected Heel flat on floor and keep that knee straight  Bend the other knee and lean into the wall  Hold that stretched position for 20-30 seconds  Repeat 5 times    DO NOT: Bounce or jerk back and forth while doing the stretch  DO Not: Point toes out to the side                  Towel Stretch      Place towel under ball of foot  Pull foot towards you and hold for 5 seconds  Then push foot down and hold for 5 seconds  Repeat this 5-10 times      6. Wear good supportive shoes with adequate shock absorption. 7. Orthotics: We recommend custom molded orthotics and we can call your insurance to see if this is a covered benefit for you. However, if they are not covered, we carry over-the- counter orthotics called Powersteps. They cost $43.00 and can be purchased with cash, check or charge at the . 8. Physical Therapy- will be prescribed as needed. Nails 1-10 were debrided in length and thickness sharply with a nail nipper and  without incident. Pt will follow up in 9 weeks or sooner if any problems arise. Diagnosis was discussed with the pt and all of their questions were answered in detail. Proper foot hygiene and care was discussed with the pt. Patient to check feet daily and contact the office with any questions/problems/concerns. Other comorbidity noted and will be managed by PCP. Pain waiver discussed with patient and confirmed.    3/15/2023    Electronically signed by Marilou Koch DPM on 3/15/2023 at 11:58 AM  3/15/2023

## 2023-05-24 ENCOUNTER — OFFICE VISIT (OUTPATIENT)
Dept: PODIATRY | Age: 68
End: 2023-05-24
Payer: MEDICARE

## 2023-05-24 VITALS — WEIGHT: 214 LBS | BODY MASS INDEX: 30.64 KG/M2 | HEIGHT: 70 IN

## 2023-05-24 DIAGNOSIS — B35.1 DERMATOPHYTOSIS OF NAIL: Primary | ICD-10-CM

## 2023-05-24 DIAGNOSIS — M79.671 PAIN IN BOTH FEET: ICD-10-CM

## 2023-05-24 DIAGNOSIS — I73.9 PVD (PERIPHERAL VASCULAR DISEASE) (HCC): ICD-10-CM

## 2023-05-24 DIAGNOSIS — M79.672 PAIN IN BOTH FEET: ICD-10-CM

## 2023-05-24 PROCEDURE — 11721 DEBRIDE NAIL 6 OR MORE: CPT | Performed by: PODIATRIST

## 2023-05-24 PROCEDURE — 99999 PR OFFICE/OUTPT VISIT,PROCEDURE ONLY: CPT | Performed by: PODIATRIST

## 2023-08-02 ENCOUNTER — OFFICE VISIT (OUTPATIENT)
Dept: PODIATRY | Age: 68
End: 2023-08-02
Payer: MEDICARE

## 2023-08-02 VITALS — WEIGHT: 214 LBS | BODY MASS INDEX: 30.64 KG/M2 | HEIGHT: 70 IN

## 2023-08-02 DIAGNOSIS — M79.671 PAIN IN BOTH FEET: ICD-10-CM

## 2023-08-02 DIAGNOSIS — B35.1 DERMATOPHYTOSIS OF NAIL: Primary | ICD-10-CM

## 2023-08-02 DIAGNOSIS — M79.672 PAIN IN BOTH FEET: ICD-10-CM

## 2023-08-02 DIAGNOSIS — I73.9 PVD (PERIPHERAL VASCULAR DISEASE) (HCC): ICD-10-CM

## 2023-08-02 PROCEDURE — 99999 PR OFFICE/OUTPT VISIT,PROCEDURE ONLY: CPT | Performed by: PODIATRIST

## 2023-08-02 PROCEDURE — 11721 DEBRIDE NAIL 6 OR MORE: CPT | Performed by: PODIATRIST

## 2023-08-02 RX ORDER — GABAPENTIN 600 MG/1
TABLET ORAL
COMMUNITY
Start: 2023-06-27

## 2023-08-02 RX ORDER — DORZOLAMIDE HCL 20 MG/ML
SOLUTION/ DROPS OPHTHALMIC
COMMUNITY
Start: 2023-07-27

## 2023-08-02 NOTE — PROGRESS NOTES
gain  Constitutional: Negative for chills, diaphoresis, fatigue, fever and unexpected weight change. Musculoskeletal: Positive for arthralgias, gait problem and joint swelling. Neurological ROS: negative for - behavioral changes, confusion, headaches or seizures. Negative for weakness and numbness. Dermatological ROS: negative for - mole changes, rash  Cardiovascular: Negative for leg swelling. Gastrointestinal: Negative for constipation, diarrhea, nausea and vomiting. Objective:  Dermatologic Exam:  Skin lesion/ulceration Absent . Skin No rashes or nodules noted. .   Skin is thin, with flaky sloughing skin as well as decreased hair growth to the lower leg  Small red hemosiderin deposits seen dorsal foot   Musculoskeletal:     1st MPJ ROM decreased, Bilateral.  Muscle strength 5/5, Bilateral.  Pain present upon palpation of toenails 1-5, Bilateral. decreased medial longitudinal arch, Bilateral.  Ankle ROM decreased,Bilateral.    Dorsally contracted digits present digits 2, Bilateral.     Vascular: DP pulses 1/4 bilateral.  PT pulses 0/4 bilateral.   CFT <5 seconds, Bilateral.  Hair growth absent to the level of the digits, Bilateral.  Edema present, Bilateral.  Varicosities absent, Bilateral. Erythema absent, Bilateral    Neurological: Sensation diminshed to light touch to level of digits, Bilateral.  Protective sensation intact 6/10 sites via 5.07/10g [place]-Rosa Monofilament, Bilateral.  negative Tinel's, Bilateral.  negative Valleix sign, Bilateral.      Integument: Warm, dry, supple, Bilateral.  Open lesion absent, Bilateral.  Interdigital maceration absent to web spaces 4, Bilateral.  Nails 1-5 left and 1-5 right thickened > 3.0 mm, dystrophic and crumbly, discolored with subungual debris. Fissures absent, Bilateral.   General: AAO x 3 in NAD.     Derm  Toenail Description  Sites of Onychomycosis Involvement (Check affected

## 2023-10-04 ENCOUNTER — OFFICE VISIT (OUTPATIENT)
Dept: PODIATRY | Age: 68
End: 2023-10-04
Payer: MEDICARE

## 2023-10-04 VITALS — WEIGHT: 214 LBS | HEIGHT: 70 IN | BODY MASS INDEX: 30.64 KG/M2

## 2023-10-04 DIAGNOSIS — B35.1 DERMATOPHYTOSIS OF NAIL: Primary | ICD-10-CM

## 2023-10-04 DIAGNOSIS — I73.9 PVD (PERIPHERAL VASCULAR DISEASE) (HCC): ICD-10-CM

## 2023-10-04 DIAGNOSIS — M79.671 PAIN IN BOTH FEET: ICD-10-CM

## 2023-10-04 DIAGNOSIS — M79.672 PAIN IN BOTH FEET: ICD-10-CM

## 2023-10-04 PROCEDURE — 11721 DEBRIDE NAIL 6 OR MORE: CPT | Performed by: PODIATRIST

## 2023-10-04 PROCEDURE — 99999 PR OFFICE/OUTPT VISIT,PROCEDURE ONLY: CPT | Performed by: PODIATRIST

## 2023-10-04 RX ORDER — CELECOXIB 200 MG/1
1 CAPSULE ORAL
COMMUNITY
Start: 2023-07-26

## 2023-10-04 NOTE — PROGRESS NOTES
[address] [place] [address],Building [facility] [id number]  Dept: [phone]     PAIN PROGRESS NOTE  Date of patient's visit: 10/4/2023  Patient's Name:  Sandy Coulter YOB: 1955            Patient Care Team:  Nunu Umana MD as PCP - General (Geriatric Medicine)      Chief Complaint   Patient presents with    Nail Problem     Toenail trim    Foot Pain     Bl feet       Subjective: This Sandy Coulter comes to clinic for foot and nail care. Pt currently has complaint of thickened, painful, elongated nails that he/she cannot manage by themselves. Pt. Relates pain to nails with shoe gear. Pt's primary care physician is Nunu Umana MD last seen July 20 2022. Pt has a new complaint of none today . Past Medical History:   Diagnosis Date    Cancer ([address])     skin & prostate    Glaucoma     Hyperlipidemia     Hypertension     Narcolepsy     Osteoarthritis     Parkinson disease     PSP (progressive supranuclear palsy) ([address]) 05/05/2017    Sleep apnea        Allergies   Allergen Reactions    Amantadine      Current Outpatient Medications on File Prior to Visit   Medication Sig Dispense Refill    celecoxib (CELEBREX) 200 MG capsule Take 1 capsule by mouth      gabapentin (NEURONTIN) 600 MG tablet       dorzolamide (TRUSOPT) 2 % ophthalmic solution       dextran 70-hypromellose (TEARS NATURALE) 0.1-0.3 % SOLN opthalmic solution instill one drop in both eyes at bedtime      methylphenidate (RITALIN) 20 MG tablet Take by mouth 2 times daily.       meloxicam (MOBIC) 15 MG tablet Take 1 tablet by mouth daily      trospium (SANCTURA) 20 MG tablet Take 1 tablet by mouth 2 times daily      Artificial Tear Solution (JUST TEARS EYE DROPS) SOLN GenTeal Tears Moderate 0.1 %-0.3 %-0.2 % eye drops   one appication at bedtime      Carboxymeth-Glycerin-Polysorb (REFRESH OPTIVE ADVANCED) 0.5-1-0.5 % SOLN Apply to eye      ciclopirox (PENLAC) 8 %

## 2023-10-23 ENCOUNTER — TELEPHONE (OUTPATIENT)
Dept: NEUROLOGY | Age: 68
End: 2023-10-23

## 2023-10-23 DIAGNOSIS — G20 PARKINSONISM, UNSPECIFIED PARKINSONISM TYPE: Primary | ICD-10-CM

## 2023-10-23 NOTE — TELEPHONE ENCOUNTER
Patient's wife, Lamar Puri, called into the office. The patient has been following with the [facility] neurology, and the neurologist there would like to refer him to Vision/Vestibular therapy with PT, and would like it to be local for the patient. The wife is wondering if there is a specific facility that you would recommend for this--they are not asking for a referral order. Please advise, thank you.

## 2023-12-06 ENCOUNTER — OFFICE VISIT (OUTPATIENT)
Dept: PODIATRY | Age: 68
End: 2023-12-06
Payer: MEDICARE

## 2023-12-06 VITALS — BODY MASS INDEX: 30.64 KG/M2 | HEIGHT: 70 IN | WEIGHT: 214 LBS

## 2023-12-06 DIAGNOSIS — B35.1 DERMATOPHYTOSIS OF NAIL: Primary | ICD-10-CM

## 2023-12-06 DIAGNOSIS — M79.672 PAIN IN BOTH FEET: ICD-10-CM

## 2023-12-06 DIAGNOSIS — I73.9 PVD (PERIPHERAL VASCULAR DISEASE) (HCC): ICD-10-CM

## 2023-12-06 DIAGNOSIS — M19.072 DEGENERATIVE JOINT DISEASE OF BOTH ANKLES AND FEET: ICD-10-CM

## 2023-12-06 DIAGNOSIS — M79.671 PAIN IN BOTH FEET: ICD-10-CM

## 2023-12-06 DIAGNOSIS — M19.071 DEGENERATIVE JOINT DISEASE OF BOTH ANKLES AND FEET: ICD-10-CM

## 2023-12-06 PROCEDURE — G8484 FLU IMMUNIZE NO ADMIN: HCPCS | Performed by: PODIATRIST

## 2023-12-06 PROCEDURE — G8417 CALC BMI ABV UP PARAM F/U: HCPCS | Performed by: PODIATRIST

## 2023-12-06 PROCEDURE — 3017F COLORECTAL CA SCREEN DOC REV: CPT | Performed by: PODIATRIST

## 2023-12-06 PROCEDURE — 11721 DEBRIDE NAIL 6 OR MORE: CPT | Performed by: PODIATRIST

## 2023-12-06 PROCEDURE — G8427 DOCREV CUR MEDS BY ELIG CLIN: HCPCS | Performed by: PODIATRIST

## 2023-12-06 PROCEDURE — 99213 OFFICE O/P EST LOW 20 MIN: CPT | Performed by: PODIATRIST

## 2023-12-06 PROCEDURE — 1036F TOBACCO NON-USER: CPT | Performed by: PODIATRIST

## 2023-12-06 PROCEDURE — 1123F ACP DISCUSS/DSCN MKR DOCD: CPT | Performed by: PODIATRIST

## 2023-12-06 RX ORDER — OXYMETAZOLINE HYDROCHLORIDE 0.05 G/100ML
SPRAY NASAL
COMMUNITY

## 2024-01-26 ENCOUNTER — HOSPITAL ENCOUNTER (OUTPATIENT)
Age: 69
Setting detail: SPECIMEN
Discharge: HOME | End: 2024-01-26

## 2024-01-26 LAB
ALBUMIN SERPL-MCNC: 4.1 G/DL (ref 3.5–5.2)
ALBUMIN/GLOB SERPL: 2 {RATIO} (ref 1–2.5)
ALP SERPL-CCNC: 93 U/L (ref 40–129)
ALT SERPL-CCNC: 28 U/L (ref 5–41)
ANION GAP SERPL CALCULATED.3IONS-SCNC: 7 MMOL/L (ref 9–17)
AST SERPL-CCNC: 20 U/L
BASOPHILS # BLD: 0.06 K/UL (ref 0–0.2)
BASOPHILS NFR BLD: 1 % (ref 0–2)
BILIRUB SERPL-MCNC: 0.3 MG/DL (ref 0.3–1.2)
BUN SERPL-MCNC: 13 MG/DL (ref 8–23)
CALCIUM SERPL-MCNC: 9 MG/DL (ref 8.6–10.4)
CHLORIDE SERPL-SCNC: 106 MMOL/L (ref 98–107)
CO2 SERPL-SCNC: 25 MMOL/L (ref 20–31)
CREAT SERPL-MCNC: 1.2 MG/DL (ref 0.7–1.2)
EOSINOPHIL # BLD: 0.51 K/UL (ref 0–0.44)
EOSINOPHILS RELATIVE PERCENT: 8 % (ref 1–4)
ERYTHROCYTE [DISTWIDTH] IN BLOOD BY AUTOMATED COUNT: 15.6 % (ref 11.8–14.4)
GFR SERPL CREATININE-BSD FRML MDRD: >60 ML/MIN/1.73M2
GLUCOSE SERPL-MCNC: 90 MG/DL (ref 70–99)
HCT VFR BLD AUTO: 39.2 % (ref 40.7–50.3)
HGB BLD-MCNC: 12.1 G/DL (ref 13–17)
IMM GRANULOCYTES # BLD AUTO: <0.03 K/UL (ref 0–0.3)
IMM GRANULOCYTES NFR BLD: 0 %
LYMPHOCYTES NFR BLD: 1.86 K/UL (ref 1.1–3.7)
LYMPHOCYTES RELATIVE PERCENT: 29 % (ref 24–43)
MCH RBC QN AUTO: 25.6 PG (ref 25.2–33.5)
MCHC RBC AUTO-ENTMCNC: 30.9 G/DL (ref 28.4–34.8)
MCV RBC AUTO: 82.9 FL (ref 82.6–102.9)
MONOCYTES NFR BLD: 0.63 K/UL (ref 0.1–1.2)
MONOCYTES NFR BLD: 10 % (ref 3–12)
NEUTROPHILS NFR BLD: 52 % (ref 36–65)
NEUTS SEG NFR BLD: 3.24 K/UL (ref 1.5–8.1)
NRBC BLD-RTO: 0 PER 100 WBC
PLATELET # BLD AUTO: 240 K/UL (ref 138–453)
PMV BLD AUTO: 10 FL (ref 8.1–13.5)
POTASSIUM SERPL-SCNC: 4.3 MMOL/L (ref 3.7–5.3)
PROT SERPL-MCNC: 6.2 G/DL (ref 6.4–8.3)
PSA SERPL-MCNC: <0.02 NG/ML
RBC # BLD AUTO: 4.73 M/UL (ref 4.21–5.77)
RBC # BLD: ABNORMAL 10*6/UL
SODIUM SERPL-SCNC: 138 MMOL/L (ref 135–144)
WBC OTHER # BLD: 6.3 K/UL (ref 3.5–11.3)

## 2024-02-07 ENCOUNTER — OFFICE VISIT (OUTPATIENT)
Dept: PODIATRY | Age: 69
End: 2024-02-07
Payer: MEDICARE

## 2024-02-07 VITALS — WEIGHT: 214 LBS | BODY MASS INDEX: 30.64 KG/M2 | HEIGHT: 70 IN

## 2024-02-07 DIAGNOSIS — I73.9 PVD (PERIPHERAL VASCULAR DISEASE) (HCC): ICD-10-CM

## 2024-02-07 DIAGNOSIS — B35.1 DERMATOPHYTOSIS OF NAIL: Primary | ICD-10-CM

## 2024-02-07 DIAGNOSIS — L85.3 XEROSIS OF SKIN: ICD-10-CM

## 2024-02-07 DIAGNOSIS — M79.672 PAIN IN BOTH FEET: ICD-10-CM

## 2024-02-07 DIAGNOSIS — M79.671 PAIN IN BOTH FEET: ICD-10-CM

## 2024-02-07 PROCEDURE — 99213 OFFICE O/P EST LOW 20 MIN: CPT | Performed by: PODIATRIST

## 2024-02-07 PROCEDURE — 11721 DEBRIDE NAIL 6 OR MORE: CPT | Performed by: PODIATRIST

## 2024-02-07 PROCEDURE — G8484 FLU IMMUNIZE NO ADMIN: HCPCS | Performed by: PODIATRIST

## 2024-02-07 PROCEDURE — 1123F ACP DISCUSS/DSCN MKR DOCD: CPT | Performed by: PODIATRIST

## 2024-02-07 PROCEDURE — 3017F COLORECTAL CA SCREEN DOC REV: CPT | Performed by: PODIATRIST

## 2024-02-07 PROCEDURE — G8427 DOCREV CUR MEDS BY ELIG CLIN: HCPCS | Performed by: PODIATRIST

## 2024-02-07 PROCEDURE — 1036F TOBACCO NON-USER: CPT | Performed by: PODIATRIST

## 2024-02-07 PROCEDURE — G8417 CALC BMI ABV UP PARAM F/U: HCPCS | Performed by: PODIATRIST

## 2024-02-12 NOTE — PROGRESS NOTES
[facility] PHYSIC[facility]ANS [facility] PODIATRY  [address] [place], [place] [id number]  Dept: [phone]     PAIN PROGRESS NOTE  Date of patient's visit: 2/7/2024  Patient's Name:  Delonte Shah YOB: 1955            Patient Care Team:  Leena Ward MD as PCP - General (Geriatric Medicine)      Chief Complaint   Patient presents with    Nail Problem     Toenail trim    Foot Pain     Bl feet       Subjective:   This Delonte Shah comes to clinic for foot and nail care.  Pt currently has complaint of thickened, painful, elongated nails that he/she cannot manage by themselves.  Pt. Relates pain to nails with shoe gear.  Pt's primary care physician is Leena Ward MD last seen November 9 2023.    Pt has a new complaint of dry scaly skin to the bottom of both of the feet.  Pt states they have tried OTC cream but it isnt applied regularly.  Pt has tried changing shoes but it has not helped the pain           Past Medical History:   Diagnosis Date    Cancer ([facility])     skin & prostate    Glaucoma     Hyperlipidemia     Hypertension     Narcolepsy     Osteoarthritis     Parkinson disease     PSP (progressive supranuclear palsy) ([facility]) 05/05/2017    Sleep apnea        Allergies   Allergen Reactions    Amantadine      Current Outpatient Medications on File Prior to Visit   Medication Sig Dispense Refill    oxymetazoline (12 HOUR NASAL SPRAY) 0.05 % nasal spray Spray 2 sprays twice a day by intranasal route.      celecoxib (CELEBREX) 200 MG capsule Take 1 capsule by mouth      gabapentin (NEURONTIN) 600 MG tablet       dorzolamide (TRUSOPT) 2 % ophthalmic solution       dextran 70-hypromellose (TEARS NATURALE) 0.1-0.3 % SOLN opthalmic solution instill one drop in both eyes at bedtime      methylphenidate (RITALIN) 20 MG tablet Take by mouth 2 times daily.      meloxicam (MOBIC) 15 MG tablet Take 1 tablet by mouth daily      trospium (SANCTURA) 20 MG tablet Take 1

## 2024-04-15 ENCOUNTER — OFFICE VISIT (OUTPATIENT)
Dept: PODIATRY | Age: 69
End: 2024-04-15
Payer: MEDICARE

## 2024-04-15 VITALS — HEIGHT: 70 IN | BODY MASS INDEX: 30.64 KG/M2 | WEIGHT: 214 LBS

## 2024-04-15 DIAGNOSIS — B35.1 DERMATOPHYTOSIS OF NAIL: Primary | ICD-10-CM

## 2024-04-15 DIAGNOSIS — M79.672 PAIN IN BOTH FEET: ICD-10-CM

## 2024-04-15 DIAGNOSIS — I73.9 PVD (PERIPHERAL VASCULAR DISEASE) (HCC): ICD-10-CM

## 2024-04-15 DIAGNOSIS — M79.671 PAIN IN BOTH FEET: ICD-10-CM

## 2024-04-15 PROCEDURE — 11721 DEBRIDE NAIL 6 OR MORE: CPT | Performed by: PODIATRIST

## 2024-04-15 NOTE — PROGRESS NOTES
Review of Systems.    Review of Systems:   History obtained from chart review and the patient  General ROS: negative for - chills, fatigue, fever, night sweats or weight gain  Constitutional: Negative for chills, diaphoresis, fatigue, fever and unexpected weight change.  Musculoskeletal: Positive for arthralgias, gait problem and joint swelling.  Neurological ROS: negative for - behavioral changes, confusion, headaches or seizures. Negative for weakness and numbness.   Dermatological ROS: negative for - mole changes, rash  Cardiovascular: Negative for leg swelling.   Gastrointestinal: Negative for constipation, diarrhea, nausea and vomiting.          Objective:  Dermatologic Exam:  Skin lesion/ulceration Absent .   Skin No rashes or nodules noted..   Skin is thin, with flaky sloughing skin as well as decreased hair growth to the lower leg  Small red hemosiderin deposits seen dorsal foot   Musculoskeletal:     1st MPJ ROM decreased, Bilateral.  Muscle strength 5/5, Bilateral.  Pain present upon palpation of toenails 1-5, Bilateral. decreased medial longitudinal arch, Bilateral.  Ankle ROM decreased,Bilateral.    Dorsally contracted digits present digits 2, Bilateral.     Vascular: DP pulses 1/4 bilateral.  PT pulses 0/4 bilateral.   CFT <5 seconds, Bilateral.  Hair growth absent to the level of the digits, Bilateral.  Edema present, Bilateral.  Varicosities absent, Bilateral. Erythema absent, Bilateral    Neurological: Sensation diminshed to light touch to level of digits, Bilateral.  Protective sensation intact 6/10 sites via 5.07/10g [place]osa Monofilament, Bilateral.  negative Tinel's, Bilateral.  negative Valleix sign, Bilateral.      Integument: Warm, dry, supple, Bilateral.  Open lesion absent, Bilateral.  Interdigital maceration absent to web spaces 4, Bilateral.  Nails 1-5 left and 1-5 right thickened > 3.0 mm, dystrophic and crumbly, discolored with subungual debris.  Fissures absent, Bilateral.

## 2024-05-01 NOTE — TELEPHONE ENCOUNTER
Britt Obrien let pt know for postural instability need to use gait support with walker and be careful  He is already on seroquel 25 mg po qhs . If behaviour becomes more of issue can readjust dose .  Let me know LVM for pt to return call to schedule a BOTOX with Larisa Landa.

## 2024-05-22 ENCOUNTER — TELEPHONE (OUTPATIENT)
Dept: PODIATRY | Age: 69
End: 2024-05-22

## 2024-05-22 NOTE — TELEPHONE ENCOUNTER
Patients wife is requesting a call in regard to the way the patients last visit was coded. She can be reached at [phone]. Okay to leave a message.